# Patient Record
Sex: MALE | Race: WHITE | NOT HISPANIC OR LATINO | Employment: FULL TIME | URBAN - METROPOLITAN AREA
[De-identification: names, ages, dates, MRNs, and addresses within clinical notes are randomized per-mention and may not be internally consistent; named-entity substitution may affect disease eponyms.]

---

## 2021-09-27 ENCOUNTER — HOSPITAL ENCOUNTER (EMERGENCY)
Facility: CLINIC | Age: 31
Discharge: STILL ADMITTED OR ADMINISTRATIVE | End: 2021-09-27

## 2021-09-27 ENCOUNTER — HOSPITAL ENCOUNTER (EMERGENCY)
Facility: CLINIC | Age: 31
Discharge: HOME | End: 2021-09-27
Attending: EMERGENCY MEDICINE | Admitting: EMERGENCY MEDICINE
Payer: COMMERCIAL

## 2021-09-27 VITALS
HEART RATE: 74 BPM | DIASTOLIC BLOOD PRESSURE: 87 MMHG | RESPIRATION RATE: 16 BRPM | OXYGEN SATURATION: 99 % | TEMPERATURE: 98.9 F | SYSTOLIC BLOOD PRESSURE: 140 MMHG | WEIGHT: 315 LBS

## 2021-09-27 DIAGNOSIS — R51.9 HEADACHE DISORDER: ICD-10-CM

## 2021-09-27 DIAGNOSIS — Z20.822 SUSPECTED COVID-19 VIRUS INFECTION: ICD-10-CM

## 2021-09-27 LAB
ALBUMIN SERPL-MCNC: 4.1 G/DL (ref 3.4–5)
ALP SERPL-CCNC: 73 U/L (ref 40–150)
ALT SERPL W P-5'-P-CCNC: 70 U/L (ref 0–70)
ANION GAP SERPL CALCULATED.3IONS-SCNC: 5 MMOL/L (ref 3–14)
AST SERPL W P-5'-P-CCNC: 32 U/L (ref 0–45)
BASOPHILS # BLD AUTO: 0 10E3/UL (ref 0–0.2)
BASOPHILS NFR BLD AUTO: 0 %
BILIRUB SERPL-MCNC: 0.4 MG/DL (ref 0.2–1.3)
BUN SERPL-MCNC: 10 MG/DL (ref 7–30)
CALCIUM SERPL-MCNC: 8.5 MG/DL (ref 8.5–10.1)
CHLORIDE BLD-SCNC: 108 MMOL/L (ref 94–109)
CO2 SERPL-SCNC: 24 MMOL/L (ref 20–32)
CREAT SERPL-MCNC: 1.11 MG/DL (ref 0.66–1.25)
CRP SERPL-MCNC: 27.2 MG/L (ref 0–8)
EOSINOPHIL # BLD AUTO: 0.1 10E3/UL (ref 0–0.7)
EOSINOPHIL NFR BLD AUTO: 1 %
ERYTHROCYTE [DISTWIDTH] IN BLOOD BY AUTOMATED COUNT: 12.3 % (ref 10–15)
ERYTHROCYTE [SEDIMENTATION RATE] IN BLOOD BY WESTERGREN METHOD: 4 MM/HR (ref 0–15)
GFR SERPL CREATININE-BSD FRML MDRD: 89 ML/MIN/1.73M2
GLUCOSE BLD-MCNC: 101 MG/DL (ref 70–99)
HCT VFR BLD AUTO: 41.9 % (ref 40–53)
HGB BLD-MCNC: 15 G/DL (ref 13.3–17.7)
IMM GRANULOCYTES # BLD: 0 10E3/UL
IMM GRANULOCYTES NFR BLD: 0 %
LYMPHOCYTES # BLD AUTO: 0.4 10E3/UL (ref 0.8–5.3)
LYMPHOCYTES NFR BLD AUTO: 7 %
MCH RBC QN AUTO: 33.6 PG (ref 26.5–33)
MCHC RBC AUTO-ENTMCNC: 35.8 G/DL (ref 31.5–36.5)
MCV RBC AUTO: 94 FL (ref 78–100)
MONOCYTES # BLD AUTO: 0.6 10E3/UL (ref 0–1.3)
MONOCYTES NFR BLD AUTO: 10 %
NEUTROPHILS # BLD AUTO: 5 10E3/UL (ref 1.6–8.3)
NEUTROPHILS NFR BLD AUTO: 82 %
NRBC # BLD AUTO: 0 10E3/UL
NRBC BLD AUTO-RTO: 0 /100
PLATELET # BLD AUTO: 154 10E3/UL (ref 150–450)
POTASSIUM BLD-SCNC: 3.7 MMOL/L (ref 3.4–5.3)
PROT SERPL-MCNC: 7.7 G/DL (ref 6.8–8.8)
RBC # BLD AUTO: 4.47 10E6/UL (ref 4.4–5.9)
SARS-COV-2 RNA RESP QL NAA+PROBE: POSITIVE
SODIUM SERPL-SCNC: 137 MMOL/L (ref 133–144)
WBC # BLD AUTO: 6.2 10E3/UL (ref 4–11)

## 2021-09-27 PROCEDURE — 96374 THER/PROPH/DIAG INJ IV PUSH: CPT

## 2021-09-27 PROCEDURE — 99285 EMERGENCY DEPT VISIT HI MDM: CPT | Mod: 25

## 2021-09-27 PROCEDURE — 86140 C-REACTIVE PROTEIN: CPT | Performed by: EMERGENCY MEDICINE

## 2021-09-27 PROCEDURE — 85025 COMPLETE CBC W/AUTO DIFF WBC: CPT | Performed by: EMERGENCY MEDICINE

## 2021-09-27 PROCEDURE — 96375 TX/PRO/DX INJ NEW DRUG ADDON: CPT

## 2021-09-27 PROCEDURE — 250N000011 HC RX IP 250 OP 636: Performed by: EMERGENCY MEDICINE

## 2021-09-27 PROCEDURE — 96361 HYDRATE IV INFUSION ADD-ON: CPT

## 2021-09-27 PROCEDURE — U0005 INFEC AGEN DETEC AMPLI PROBE: HCPCS | Performed by: EMERGENCY MEDICINE

## 2021-09-27 PROCEDURE — 80053 COMPREHEN METABOLIC PANEL: CPT | Performed by: EMERGENCY MEDICINE

## 2021-09-27 PROCEDURE — 85652 RBC SED RATE AUTOMATED: CPT | Performed by: EMERGENCY MEDICINE

## 2021-09-27 PROCEDURE — 99284 EMERGENCY DEPT VISIT MOD MDM: CPT | Performed by: EMERGENCY MEDICINE

## 2021-09-27 PROCEDURE — 36415 COLL VENOUS BLD VENIPUNCTURE: CPT | Performed by: EMERGENCY MEDICINE

## 2021-09-27 PROCEDURE — 258N000003 HC RX IP 258 OP 636: Performed by: EMERGENCY MEDICINE

## 2021-09-27 RX ORDER — ACETAMINOPHEN 500 MG
1000 TABLET ORAL EVERY 6 HOURS PRN
COMMUNITY

## 2021-09-27 RX ORDER — HYDROMORPHONE HYDROCHLORIDE 1 MG/ML
0.5 INJECTION, SOLUTION INTRAMUSCULAR; INTRAVENOUS; SUBCUTANEOUS
Status: DISCONTINUED | OUTPATIENT
Start: 2021-09-27 | End: 2021-09-27 | Stop reason: HOSPADM

## 2021-09-27 RX ORDER — KETOROLAC TROMETHAMINE 15 MG/ML
15 INJECTION, SOLUTION INTRAMUSCULAR; INTRAVENOUS ONCE
Status: COMPLETED | OUTPATIENT
Start: 2021-09-27 | End: 2021-09-27

## 2021-09-27 RX ORDER — IBUPROFEN 200 MG
400 TABLET ORAL EVERY 4 HOURS PRN
COMMUNITY

## 2021-09-27 RX ORDER — PAROXETINE HYDROCHLORIDE HEMIHYDRATE 37.5 MG/1
37.5 TABLET, FILM COATED, EXTENDED RELEASE ORAL DAILY
COMMUNITY
Start: 2021-08-05

## 2021-09-27 RX ORDER — SODIUM CHLORIDE 9 MG/ML
INJECTION, SOLUTION INTRAVENOUS CONTINUOUS
Status: DISCONTINUED | OUTPATIENT
Start: 2021-09-27 | End: 2021-09-27 | Stop reason: HOSPADM

## 2021-09-27 RX ADMIN — HYDROMORPHONE HYDROCHLORIDE 0.5 MG: 1 INJECTION, SOLUTION INTRAMUSCULAR; INTRAVENOUS; SUBCUTANEOUS at 17:37

## 2021-09-27 RX ADMIN — KETOROLAC TROMETHAMINE 15 MG: 15 INJECTION, SOLUTION INTRAMUSCULAR; INTRAVENOUS at 16:27

## 2021-09-27 RX ADMIN — SODIUM CHLORIDE 1000 ML: 9 INJECTION, SOLUTION INTRAVENOUS at 16:27

## 2021-09-27 NOTE — DISCHARGE INSTRUCTIONS
Return to the emergency department if you develop new or worsening symptoms.  Use ibuprofen and Tylenol for the headache.  If your Covid swab is negative and you still have a headache we will need to reevaluate and potentially do further testing.  I hope you get better quickly.  Please quarantine yourself until you are certain you do not have Covid.

## 2021-09-27 NOTE — ED PROVIDER NOTES
History     Chief Complaint   Patient presents with     Fever     Headache     HPI  Gucci Khan is a 31 year old male who presents to the ER secondary to fever, headache, light sensitivity and exposure to covid.  He has been sick for two days. No recent tick bites.  No altered mental status.  Temp up to 102.  No body aches, cough, congestion or runny nose. Oscar gregg developed fever as well and tested positive for covid.      Allergies:  Allergies   Allergen Reactions     Sulfa Drugs Rash     Zithromax [Azithromycin] Rash       Problem List:    There are no problems to display for this patient.       Past Medical History:    History reviewed. No pertinent past medical history.    Past Surgical History:    History reviewed. No pertinent surgical history.    Family History:    History reviewed. No pertinent family history.    Social History:  Marital Status:  Single [1]  Social History     Tobacco Use     Smoking status: Never Smoker     Smokeless tobacco: Current User     Types: Chew   Substance Use Topics     Alcohol use: Not Currently     Comment: occ     Drug use: Not Currently     Types: Marijuana     Comment: from time to time        Medications:    acetaminophen (TYLENOL) 500 MG tablet  ibuprofen (ADVIL/MOTRIN) 200 MG tablet  PARoxetine (PAXIL-CR) 37.5 MG 24 hr tablet          Review of Systems   All other systems reviewed and are negative.      Physical Exam   BP: (!) 154/102  Pulse: 104  Temp: 98.9  F (37.2  C)  Resp: 18  Weight: 146.6 kg (323 lb 1.6 oz)  SpO2: 98 %      Physical Exam  Vitals and nursing note reviewed.   Constitutional:       General: He is not in acute distress.     Appearance: He is well-developed. He is not diaphoretic.   HENT:      Head: Normocephalic and atraumatic.      Nose: Nose normal.      Mouth/Throat:      Mouth: Mucous membranes are moist.   Eyes:      General: No scleral icterus.        Right eye: No discharge.         Left eye: No discharge.      Conjunctiva/sclera:  Conjunctivae normal.      Comments: Mild photophobia   Cardiovascular:      Rate and Rhythm: Normal rate and regular rhythm.   Musculoskeletal:      Cervical back: Normal range of motion and neck supple.   Skin:     General: Skin is warm and dry.      Findings: No rash.   Neurological:      General: No focal deficit present.      Mental Status: He is alert and oriented to person, place, and time.   Psychiatric:         Mood and Affect: Mood normal.         Thought Content: Thought content normal.         ED Course        Procedures             Results for orders placed or performed during the hospital encounter of 09/27/21 (from the past 24 hour(s))   CBC with platelets differential    Narrative    The following orders were created for panel order CBC with platelets differential.  Procedure                               Abnormality         Status                     ---------                               -----------         ------                     CBC with platelets and d...[[id number]]  Abnormal            Final result                 Please view results for these tests on the individual orders.   Comprehensive metabolic panel   Result Value Ref Range    Sodium 137 133 - 144 mmol/L    Potassium 3.7 3.4 - 5.3 mmol/L    Chloride 108 94 - 109 mmol/L    Carbon Dioxide (CO2) 24 20 - 32 mmol/L    Anion Gap 5 3 - 14 mmol/L    Urea Nitrogen 10 7 - 30 mg/dL    Creatinine 1.11 0.66 - 1.25 mg/dL    Calcium 8.5 8.5 - 10.1 mg/dL    Glucose 101 (H) 70 - 99 mg/dL    Alkaline Phosphatase 73 40 - 150 U/L    AST 32 0 - 45 U/L    ALT 70 0 - 70 U/L    Protein Total 7.7 6.8 - 8.8 g/dL    Albumin 4.1 3.4 - 5.0 g/dL    Bilirubin Total 0.4 0.2 - 1.3 mg/dL    GFR Estimate 89 >60 mL/min/1.73m2   CRP inflammation   Result Value Ref Range    CRP Inflammation 27.2 (H) 0.0 - 8.0 mg/L   Erythrocyte sedimentation rate auto   Result Value Ref Range    Erythrocyte Sedimentation Rate 4 0 - 15 mm/hr   CBC with platelets and differential   Result  Value Ref Range    WBC Count 6.2 4.0 - 11.0 10e3/uL    RBC Count 4.47 4.40 - 5.90 10e6/uL    Hemoglobin 15.0 13.3 - 17.7 g/dL    Hematocrit 41.9 40.0 - 53.0 %    MCV 94 78 - 100 fL    MCH 33.6 (H) 26.5 - 33.0 pg    MCHC 35.8 31.5 - 36.5 g/dL    RDW 12.3 10.0 - 15.0 %    Platelet Count 154 150 - 450 10e3/uL    % Neutrophils 82 %    % Lymphocytes 7 %    % Monocytes 10 %    % Eosinophils 1 %    % Basophils 0 %    % Immature Granulocytes 0 %    NRBCs per 100 WBC 0 <1 /100    Absolute Neutrophils 5.0 1.6 - 8.3 10e3/uL    Absolute Lymphocytes 0.4 (L) 0.8 - 5.3 10e3/uL    Absolute Monocytes 0.6 0.0 - 1.3 10e3/uL    Absolute Eosinophils 0.1 0.0 - 0.7 10e3/uL    Absolute Basophils 0.0 0.0 - 0.2 10e3/uL    Absolute Immature Granulocytes 0.0 <=0.0 10e3/uL    Absolute NRBCs 0.0 10e3/uL       Medications   0.9% sodium chloride BOLUS (0 mLs Intravenous Stopped 9/27/21 1737)     Followed by   sodium chloride 0.9% infusion (has no administration in time range)   HYDROmorphone (PF) (DILAUDID) injection 0.5 mg (0.5 mg Intravenous Given 9/27/21 1737)   ketorolac (TORADOL) injection 15 mg (15 mg Intravenous Given 9/27/21 1627)       Assessments & Plan (with Medical Decision Making)  Headache, fever, exposure to covid  Saline lock, ivf, toradol, labs drawn. crp elevated wbc count is normal. Likely covid. covid pcr is pending.  Headache improved with the dilaudid. No photophobia, no confusion.  Likely covid.  Patient warned that if covid negative and headache persists without other symptoms would need further work up such has head ct, LP.  Patient understands and agrees with the plan. Discharged home in stable condition.      I have reviewed the nursing notes.    I have reviewed the findings, diagnosis, plan and need for follow up with the patient.      New Prescriptions    No medications on file       Final diagnoses:   Suspected COVID-19 virus infection   Headache disorder       9/27/2021   [facility] EMERGENCY DEPT      Altaf Stack MD  09/27/21 4207

## 2021-10-24 ENCOUNTER — HEALTH MAINTENANCE LETTER (OUTPATIENT)
Age: 31
End: 2021-10-24

## 2022-10-15 ENCOUNTER — HEALTH MAINTENANCE LETTER (OUTPATIENT)
Age: 32
End: 2022-10-15

## 2022-12-03 ENCOUNTER — HEALTH MAINTENANCE LETTER (OUTPATIENT)
Age: 32
End: 2022-12-03

## 2023-08-13 ENCOUNTER — HOSPITAL ENCOUNTER (EMERGENCY)
Facility: CLINIC | Age: 33
Discharge: HOME | End: 2023-08-13
Attending: FAMILY MEDICINE | Admitting: FAMILY MEDICINE
Payer: COMMERCIAL

## 2023-08-13 VITALS
BODY MASS INDEX: 34.7 KG/M2 | HEIGHT: 77 IN | OXYGEN SATURATION: 97 % | TEMPERATURE: 97.7 F | WEIGHT: 293.9 LBS | HEART RATE: 84 BPM | SYSTOLIC BLOOD PRESSURE: 145 MMHG | RESPIRATION RATE: 20 BRPM | DIASTOLIC BLOOD PRESSURE: 105 MMHG

## 2023-08-13 DIAGNOSIS — M25.50 ARTHRALGIA, UNSPECIFIED JOINT: ICD-10-CM

## 2023-08-13 LAB
ALBUMIN SERPL BCG-MCNC: 3.8 G/DL (ref 3.5–5.2)
ALBUMIN UR-MCNC: NEGATIVE MG/DL
ALP SERPL-CCNC: 99 U/L (ref 40–129)
ALT SERPL W P-5'-P-CCNC: 26 U/L (ref 0–70)
ANION GAP SERPL CALCULATED.3IONS-SCNC: 15 MMOL/L (ref 7–15)
APPEARANCE UR: CLEAR
AST SERPL W P-5'-P-CCNC: 16 U/L (ref 0–45)
BASOPHILS # BLD AUTO: 0.1 10E3/UL (ref 0–0.2)
BASOPHILS NFR BLD AUTO: 1 %
BILIRUB SERPL-MCNC: 0.5 MG/DL
BILIRUB UR QL STRIP: NEGATIVE
BUN SERPL-MCNC: 15.9 MG/DL (ref 6–20)
CALCIUM SERPL-MCNC: 8.7 MG/DL (ref 8.6–10)
CHLORIDE SERPL-SCNC: 105 MMOL/L (ref 98–107)
COLOR UR AUTO: YELLOW
CREAT SERPL-MCNC: 0.79 MG/DL (ref 0.67–1.17)
CRP SERPL-MCNC: 49.33 MG/L
DEPRECATED HCO3 PLAS-SCNC: 18 MMOL/L (ref 22–29)
EOSINOPHIL # BLD AUTO: 0.5 10E3/UL (ref 0–0.7)
EOSINOPHIL NFR BLD AUTO: 4 %
ERYTHROCYTE [DISTWIDTH] IN BLOOD BY AUTOMATED COUNT: 12.2 % (ref 10–15)
ERYTHROCYTE [SEDIMENTATION RATE] IN BLOOD BY WESTERGREN METHOD: 11 MM/HR (ref 0–15)
GFR SERPL CREATININE-BSD FRML MDRD: >90 ML/MIN/1.73M2
GLUCOSE SERPL-MCNC: 113 MG/DL (ref 70–99)
GLUCOSE UR STRIP-MCNC: NEGATIVE MG/DL
HCT VFR BLD AUTO: 40.4 % (ref 40–53)
HGB BLD-MCNC: 13.8 G/DL (ref 13.3–17.7)
HGB UR QL STRIP: NEGATIVE
HYALINE CASTS: 1 /LPF
IMM GRANULOCYTES # BLD: 0.1 10E3/UL
IMM GRANULOCYTES NFR BLD: 1 %
KETONES UR STRIP-MCNC: NEGATIVE MG/DL
LEUKOCYTE ESTERASE UR QL STRIP: NEGATIVE
LYMPHOCYTES # BLD AUTO: 1.9 10E3/UL (ref 0.8–5.3)
LYMPHOCYTES NFR BLD AUTO: 16 %
MCH RBC QN AUTO: 32.3 PG (ref 26.5–33)
MCHC RBC AUTO-ENTMCNC: 34.2 G/DL (ref 31.5–36.5)
MCV RBC AUTO: 95 FL (ref 78–100)
MONOCYTES # BLD AUTO: 0.7 10E3/UL (ref 0–1.3)
MONOCYTES NFR BLD AUTO: 6 %
MUCOUS THREADS #/AREA URNS LPF: PRESENT /LPF
NEUTROPHILS # BLD AUTO: 8.6 10E3/UL (ref 1.6–8.3)
NEUTROPHILS NFR BLD AUTO: 72 %
NITRATE UR QL: NEGATIVE
NRBC # BLD AUTO: 0 10E3/UL
NRBC BLD AUTO-RTO: 0 /100
PH UR STRIP: 5 [PH] (ref 5–7)
PLATELET # BLD AUTO: 212 10E3/UL (ref 150–450)
POTASSIUM SERPL-SCNC: 4.1 MMOL/L (ref 3.4–5.3)
PROT SERPL-MCNC: 6.9 G/DL (ref 6.4–8.3)
RBC # BLD AUTO: 4.27 10E6/UL (ref 4.4–5.9)
RBC URINE: <1 /HPF
SODIUM SERPL-SCNC: 138 MMOL/L (ref 136–145)
SP GR UR STRIP: 1.03 (ref 1–1.03)
SQUAMOUS EPITHELIAL: <1 /HPF
TSH SERPL DL<=0.005 MIU/L-ACNC: 0.57 UIU/ML (ref 0.3–4.2)
URATE SERPL-MCNC: 5.7 MG/DL (ref 3.4–7)
UROBILINOGEN UR STRIP-MCNC: NORMAL MG/DL
WBC # BLD AUTO: 11.9 10E3/UL (ref 4–11)
WBC URINE: 1 /HPF

## 2023-08-13 PROCEDURE — 86140 C-REACTIVE PROTEIN: CPT | Performed by: FAMILY MEDICINE

## 2023-08-13 PROCEDURE — 36415 COLL VENOUS BLD VENIPUNCTURE: CPT | Performed by: FAMILY MEDICINE

## 2023-08-13 PROCEDURE — 84550 ASSAY OF BLOOD/URIC ACID: CPT | Performed by: FAMILY MEDICINE

## 2023-08-13 PROCEDURE — 80053 COMPREHEN METABOLIC PANEL: CPT | Performed by: FAMILY MEDICINE

## 2023-08-13 PROCEDURE — 84443 ASSAY THYROID STIM HORMONE: CPT | Performed by: FAMILY MEDICINE

## 2023-08-13 PROCEDURE — 250N000011 HC RX IP 250 OP 636: Mod: JZ | Performed by: FAMILY MEDICINE

## 2023-08-13 PROCEDURE — 99284 EMERGENCY DEPT VISIT MOD MDM: CPT | Mod: 25 | Performed by: FAMILY MEDICINE

## 2023-08-13 PROCEDURE — 85652 RBC SED RATE AUTOMATED: CPT | Performed by: FAMILY MEDICINE

## 2023-08-13 PROCEDURE — 86038 ANTINUCLEAR ANTIBODIES: CPT | Performed by: FAMILY MEDICINE

## 2023-08-13 PROCEDURE — 81001 URINALYSIS AUTO W/SCOPE: CPT | Performed by: FAMILY MEDICINE

## 2023-08-13 PROCEDURE — 86431 RHEUMATOID FACTOR QUANT: CPT | Performed by: FAMILY MEDICINE

## 2023-08-13 PROCEDURE — 99284 EMERGENCY DEPT VISIT MOD MDM: CPT | Performed by: FAMILY MEDICINE

## 2023-08-13 PROCEDURE — 85025 COMPLETE CBC W/AUTO DIFF WBC: CPT | Performed by: FAMILY MEDICINE

## 2023-08-13 PROCEDURE — 96372 THER/PROPH/DIAG INJ SC/IM: CPT | Performed by: FAMILY MEDICINE

## 2023-08-13 RX ORDER — HYDROCODONE BITARTRATE AND ACETAMINOPHEN 5; 325 MG/1; MG/1
1 TABLET ORAL
Qty: 6 TABLET | Refills: 0 | Status: SHIPPED | OUTPATIENT
Start: 2023-08-13 | End: 2024-07-25

## 2023-08-13 RX ORDER — PREDNISONE 20 MG/1
60 TABLET ORAL DAILY
Qty: 15 TABLET | Refills: 0 | Status: SHIPPED | OUTPATIENT
Start: 2023-08-13 | End: 2024-07-25

## 2023-08-13 RX ORDER — KETOROLAC TROMETHAMINE 30 MG/ML
60 INJECTION, SOLUTION INTRAMUSCULAR; INTRAVENOUS ONCE
Status: COMPLETED | OUTPATIENT
Start: 2023-08-13 | End: 2023-08-13

## 2023-08-13 RX ADMIN — KETOROLAC TROMETHAMINE 60 MG: 30 INJECTION, SOLUTION INTRAMUSCULAR at 08:28

## 2023-08-13 NOTE — DISCHARGE INSTRUCTIONS
1.  I have put in a referral for you to follow-up with the clinic.  Someone should be contacting you tomorrow to set up an appointment.  2.  Please continue to use the ibuprofen and Tylenol during the day, use the hydrocodone only at night to help you sleep.  Start the prednisone I have prescribed you today.

## 2023-08-13 NOTE — ED PROVIDER NOTES
History     Chief Complaint   Patient presents with    Joint Pain     HPI  Gucci Khan is a 33 year old male who presents to the emergency department with complaints of generalized joint aches that have been going on for the past 3 to 4 weeks.  Patient was seen in clinic about 10 days ago and had Lyme's testing which was negative and a CBC which was negative.  Patient was tried on a 5-day course of prednisone.  Patient states she did not get any improvement from that and continues to have symptoms and feels like they are getting little bit worse.  Last night he had a really hard time sleeping.  He is also taken ibuprofen with no effect.  Patient denies any joint swellings.  He states that all of his joints are involved, any joints that move or pivot.  Denies any dysuria or hematuria.  Denies any fevers or chills.  Denies any new medications.  Denies any rashes.  Denies any URI-like symptoms.  There is no family history of rheumatoid arthritis or other rheumatological problems.  Patient does have a history of gout but has never had multijoint involvement.    Allergies:  Allergies   Allergen Reactions    Sulfa Antibiotics Rash    Zithromax [Azithromycin] Rash       Problem List:    There are no problems to display for this patient.       Past Medical History:    No past medical history on file.    Past Surgical History:    No past surgical history on file.    Family History:    No family history on file.    Social History:  Marital Status:  Single [1]  Social History     Tobacco Use    Smoking status: Never    Smokeless tobacco: Current     Types: Chew   Substance Use Topics    Alcohol use: Not Currently     Comment: occ    Drug use: Not Currently     Types: Marijuana     Comment: from time to time        Medications:    HYDROcodone-acetaminophen (NORCO) 5-325 MG tablet  predniSONE (DELTASONE) 20 MG tablet  acetaminophen (TYLENOL) 500 MG tablet  ibuprofen (ADVIL/MOTRIN) 200 MG tablet  PARoxetine (PAXIL-CR) 37.5 MG  "24 hr tablet          Review of Systems   All other systems reviewed and are negative.      Physical Exam   BP: (!) 152/100  Pulse: 92  Temp: 97.7  F (36.5  C)  Resp: 20  Height: 195.6 cm (6' 5\")  Weight: 133.3 kg (293 lb 14.4 oz)  SpO2: 98 %      Physical Exam  Vitals and nursing note reviewed.   Constitutional:       General: He is not in acute distress.     Appearance: He is well-developed. He is not diaphoretic.   HENT:      Head: Normocephalic and atraumatic.      Nose: Nose normal.      Mouth/Throat:      Pharynx: No oropharyngeal exudate.   Eyes:      General: No scleral icterus.     Conjunctiva/sclera: Conjunctivae normal.      Pupils: Pupils are equal, round, and reactive to light.   Cardiovascular:      Rate and Rhythm: Normal rate and regular rhythm.      Heart sounds: Normal heart sounds. No murmur heard.     No friction rub.   Pulmonary:      Effort: Pulmonary effort is normal. No respiratory distress.      Breath sounds: Normal breath sounds. No wheezing or rales.   Abdominal:      General: Bowel sounds are normal. There is no distension.      Palpations: Abdomen is soft. There is no mass.      Tenderness: There is no abdominal tenderness. There is no guarding or rebound.   Musculoskeletal:         General: No swelling, tenderness or signs of injury. Normal range of motion.      Cervical back: Normal range of motion.      Right lower leg: No edema.      Left lower leg: No edema.   Skin:     General: Skin is warm.      Findings: No rash.   Neurological:      Mental Status: He is alert and oriented to person, place, and time.   Psychiatric:         Judgment: Judgment normal.         ED Course                 Procedures        Results for orders placed or performed during the hospital encounter of 08/13/23 (from the past 24 hour(s))   CBC with platelets differential    Narrative    The following orders were created for panel order CBC with platelets differential.  Procedure                               " Abnormality         Status                     ---------                               -----------         ------                     CBC with platelets and d...[[id number]]  Abnormal            Final result                 Please view results for these tests on the individual orders.   Comprehensive metabolic panel   Result Value Ref Range    Sodium 138 136 - 145 mmol/L    Potassium 4.1 3.4 - 5.3 mmol/L    Chloride 105 98 - 107 mmol/L    Carbon Dioxide (CO2) 18 (L) 22 - 29 mmol/L    Anion Gap 15 7 - 15 mmol/L    Urea Nitrogen 15.9 6.0 - 20.0 mg/dL    Creatinine 0.79 0.67 - 1.17 mg/dL    Calcium 8.7 8.6 - 10.0 mg/dL    Glucose 113 (H) 70 - 99 mg/dL    Alkaline Phosphatase 99 40 - 129 U/L    AST 16 0 - 45 U/L    ALT 26 0 - 70 U/L    Protein Total 6.9 6.4 - 8.3 g/dL    Albumin 3.8 3.5 - 5.2 g/dL    Bilirubin Total 0.5 <=1.2 mg/dL    GFR Estimate >90 >60 mL/min/1.73m2   CRP inflammation   Result Value Ref Range    CRP Inflammation 49.33 (H) <5.00 mg/L   Erythrocyte sedimentation rate auto   Result Value Ref Range    Erythrocyte Sedimentation Rate 11 0 - 15 mm/hr   Uric acid   Result Value Ref Range    Uric Acid 5.7 3.4 - 7.0 mg/dL   TSH with free T4 reflex   Result Value Ref Range    TSH 0.57 0.30 - 4.20 uIU/mL   CBC with platelets and differential   Result Value Ref Range    WBC Count 11.9 (H) 4.0 - 11.0 10e3/uL    RBC Count 4.27 (L) 4.40 - 5.90 10e6/uL    Hemoglobin 13.8 13.3 - 17.7 g/dL    Hematocrit 40.4 40.0 - 53.0 %    MCV 95 78 - 100 fL    MCH 32.3 26.5 - 33.0 pg    MCHC 34.2 31.5 - 36.5 g/dL    RDW 12.2 10.0 - 15.0 %    Platelet Count 212 150 - 450 10e3/uL    % Neutrophils 72 %    % Lymphocytes 16 %    % Monocytes 6 %    % Eosinophils 4 %    % Basophils 1 %    % Immature Granulocytes 1 %    NRBCs per 100 WBC 0 <1 /100    Absolute Neutrophils 8.6 (H) 1.6 - 8.3 10e3/uL    Absolute Lymphocytes 1.9 0.8 - 5.3 10e3/uL    Absolute Monocytes 0.7 0.0 - 1.3 10e3/uL    Absolute Eosinophils 0.5 0.0 - 0.7 10e3/uL    Absolute  Basophils 0.1 0.0 - 0.2 10e3/uL    Absolute Immature Granulocytes 0.1 <=0.4 10e3/uL    Absolute NRBCs 0.0 10e3/uL   UA with Microscopic reflex to Culture    Specimen: Urine, Clean Catch   Result Value Ref Range    Color Urine Yellow Colorless, Straw, Light Yellow, Yellow    Appearance Urine Clear Clear    Glucose Urine Negative Negative mg/dL    Bilirubin Urine Negative Negative    Ketones Urine Negative Negative mg/dL    Specific Gravity Urine 1.030 1.003 - 1.035    Blood Urine Negative Negative    pH Urine 5.0 5.0 - 7.0    Protein Albumin Urine Negative Negative mg/dL    Urobilinogen Urine Normal Normal, 2.0 mg/dL    Nitrite Urine Negative Negative    Leukocyte Esterase Urine Negative Negative    Mucus Urine Present (A) None Seen /LPF    RBC Urine <1 <=2 /HPF    WBC Urine 1 <=5 /HPF    Squamous Epithelials Urine <1 <=1 /HPF    Hyaline Casts Urine 1 <=2 /LPF    Narrative    Urine Culture not indicated       Medications   ketorolac (TORADOL) injection 60 mg (60 mg Intramuscular $Given 8/13/23 0839)     This is a 33-year-old male who comes in for joint aches that have been going on for a number of weeks now.  Patient's already had some labs done which were unremarkable.  I added on some additional labs here including rheumatoid panel and lupus panels which are pending.  I am not sure what is causing his symptoms.  Today his CRP was somewhat elevated so clearly there is some type of inflammatory response that is going on.  Patient was put on steroids before but only at 40 mg, I am going to try him on some steroids again and this time at 60 mg to see if the higher dose may work better.  I need patient to follow-up in clinic to go over the outstanding labs and to discuss further testing for his symptoms.  I do not think there is any thing from the emergency department further that we can do.  Patient may benefit from a referral to rheumatology.  Patient will be discharged at this time.    Assessments & Plan (with  Medical Decision Making)  Arthralgias     I have reviewed the nursing notes.    I have reviewed the findings, diagnosis, plan and need for follow up with the patient.      Discharge Medication List as of 8/13/2023  8:57 AM        START taking these medications    Details   HYDROcodone-acetaminophen (NORCO) 5-325 MG tablet Take 1 tablet by mouth nightly as needed for severe pain, Disp-6 tablet, R-0, E-Prescribe      predniSONE (DELTASONE) 20 MG tablet Take 3 tablets (60 mg) by mouth daily, Disp-15 tablet, R-0, E-Prescribe             Final diagnoses:   Arthralgia, unspecified joint       8/13/2023   [facility] EMERGENCY DEPT       Memo Givens MD  08/13/23 5970

## 2023-08-13 NOTE — ED TRIAGE NOTES
Pt c/o muscle and joint pain that started 4 weeks ago. Was tested for Lyme's a couple weeks ago.   Denies, cough, fevers, CP, or SOA.      Triage Assessment       Row Name 08/13/23 9851       Triage Assessment (Adult)    Airway WDL WDL       Respiratory WDL    Respiratory WDL WDL       Cardiac WDL    Cardiac WDL WDL

## 2023-08-14 LAB
ANA SER QL IF: NEGATIVE
RHEUMATOID FACT SER NEPH-ACNC: <6 IU/ML

## 2023-10-10 ENCOUNTER — TRANSCRIBE ORDERS (OUTPATIENT)
Dept: OTHER | Age: 33
End: 2023-10-10

## 2023-10-10 DIAGNOSIS — R23.0 CYANOSIS OF TIP OF FINGER: ICD-10-CM

## 2023-10-10 DIAGNOSIS — M79.644 PAIN IN FINGER OF BOTH HANDS: Primary | ICD-10-CM

## 2023-10-10 DIAGNOSIS — M25.50 ARTHRALGIA, UNSPECIFIED JOINT: ICD-10-CM

## 2023-10-10 DIAGNOSIS — M79.645 PAIN IN FINGER OF BOTH HANDS: Primary | ICD-10-CM

## 2024-01-07 ENCOUNTER — HEALTH MAINTENANCE LETTER (OUTPATIENT)
Age: 34
End: 2024-01-07

## 2024-07-24 ENCOUNTER — HOSPITAL ENCOUNTER (EMERGENCY)
Facility: CLINIC | Age: 34
Discharge: HOME | End: 2024-07-24
Attending: FAMILY MEDICINE | Admitting: FAMILY MEDICINE
Payer: COMMERCIAL

## 2024-07-24 ENCOUNTER — APPOINTMENT (OUTPATIENT)
Dept: CT IMAGING | Facility: CLINIC | Age: 34
End: 2024-07-24
Attending: FAMILY MEDICINE
Payer: COMMERCIAL

## 2024-07-24 VITALS
TEMPERATURE: 98.6 F | WEIGHT: 289.9 LBS | HEART RATE: 88 BPM | RESPIRATION RATE: 21 BRPM | OXYGEN SATURATION: 99 % | DIASTOLIC BLOOD PRESSURE: 98 MMHG | BODY MASS INDEX: 34.38 KG/M2 | SYSTOLIC BLOOD PRESSURE: 158 MMHG

## 2024-07-24 DIAGNOSIS — R10.30 LOWER ABDOMINAL PAIN: ICD-10-CM

## 2024-07-24 DIAGNOSIS — R19.7 DIARRHEA, UNSPECIFIED TYPE: ICD-10-CM

## 2024-07-24 DIAGNOSIS — K52.9 ACUTE COLITIS: ICD-10-CM

## 2024-07-24 LAB
ADV 40+41 DNA STL QL NAA+NON-PROBE: NEGATIVE
ALBUMIN SERPL BCG-MCNC: 4.4 G/DL (ref 3.5–5.2)
ALBUMIN UR-MCNC: 30 MG/DL
ALP SERPL-CCNC: 110 U/L (ref 40–150)
ALT SERPL W P-5'-P-CCNC: 24 U/L (ref 0–70)
ANION GAP SERPL CALCULATED.3IONS-SCNC: 12 MMOL/L (ref 7–15)
APPEARANCE UR: CLEAR
AST SERPL W P-5'-P-CCNC: 15 U/L (ref 0–45)
ASTRO TYP 1-8 RNA STL QL NAA+NON-PROBE: NEGATIVE
BASOPHILS # BLD AUTO: 0 10E3/UL (ref 0–0.2)
BASOPHILS NFR BLD AUTO: 0 %
BILIRUB SERPL-MCNC: 0.4 MG/DL
BILIRUB UR QL STRIP: NEGATIVE
BUN SERPL-MCNC: 8.1 MG/DL (ref 6–20)
C CAYETANENSIS DNA STL QL NAA+NON-PROBE: NEGATIVE
CALCIUM SERPL-MCNC: 9.6 MG/DL (ref 8.8–10.4)
CAMPYLOBACTER DNA SPEC NAA+PROBE: NEGATIVE
CHLORIDE SERPL-SCNC: 100 MMOL/L (ref 98–107)
COLOR UR AUTO: YELLOW
CREAT SERPL-MCNC: 0.91 MG/DL (ref 0.67–1.17)
CRP SERPL-MCNC: 50.37 MG/L
CRYPTOSP DNA STL QL NAA+NON-PROBE: NEGATIVE
E COLI O157 DNA STL QL NAA+NON-PROBE: NORMAL
E HISTOLYT DNA STL QL NAA+NON-PROBE: NEGATIVE
EAEC ASTA GENE ISLT QL NAA+PROBE: NEGATIVE
EC STX1+STX2 GENES STL QL NAA+NON-PROBE: NEGATIVE
EGFRCR SERPLBLD CKD-EPI 2021: >90 ML/MIN/1.73M2
EOSINOPHIL # BLD AUTO: 0.1 10E3/UL (ref 0–0.7)
EOSINOPHIL NFR BLD AUTO: 1 %
EPEC EAE GENE STL QL NAA+NON-PROBE: NEGATIVE
ERYTHROCYTE [DISTWIDTH] IN BLOOD BY AUTOMATED COUNT: 12.2 % (ref 10–15)
ETEC LTA+ST1A+ST1B TOX ST NAA+NON-PROBE: NEGATIVE
G LAMBLIA DNA STL QL NAA+NON-PROBE: NEGATIVE
GLUCOSE SERPL-MCNC: 106 MG/DL (ref 70–99)
GLUCOSE UR STRIP-MCNC: NEGATIVE MG/DL
HCO3 SERPL-SCNC: 23 MMOL/L (ref 22–29)
HCT VFR BLD AUTO: 45.9 % (ref 40–53)
HGB BLD-MCNC: 16.3 G/DL (ref 13.3–17.7)
HGB UR QL STRIP: NEGATIVE
IMM GRANULOCYTES # BLD: 0.1 10E3/UL
IMM GRANULOCYTES NFR BLD: 0 %
KETONES UR STRIP-MCNC: 5 MG/DL
LEUKOCYTE ESTERASE UR QL STRIP: NEGATIVE
LYMPHOCYTES # BLD AUTO: 2 10E3/UL (ref 0.8–5.3)
LYMPHOCYTES NFR BLD AUTO: 15 %
MCH RBC QN AUTO: 32.9 PG (ref 26.5–33)
MCHC RBC AUTO-ENTMCNC: 35.5 G/DL (ref 31.5–36.5)
MCV RBC AUTO: 93 FL (ref 78–100)
MONOCYTES # BLD AUTO: 0.9 10E3/UL (ref 0–1.3)
MONOCYTES NFR BLD AUTO: 6 %
MUCOUS THREADS #/AREA URNS LPF: PRESENT /LPF
NEUTROPHILS # BLD AUTO: 10.4 10E3/UL (ref 1.6–8.3)
NEUTROPHILS NFR BLD AUTO: 78 %
NITRATE UR QL: NEGATIVE
NOROVIRUS GI+II RNA STL QL NAA+NON-PROBE: NEGATIVE
NRBC # BLD AUTO: 0 10E3/UL
NRBC BLD AUTO-RTO: 0 /100
P SHIGELLOIDES DNA STL QL NAA+NON-PROBE: NEGATIVE
PH UR STRIP: 5 [PH] (ref 5–7)
PLATELET # BLD AUTO: 232 10E3/UL (ref 150–450)
POTASSIUM SERPL-SCNC: 3.9 MMOL/L (ref 3.4–5.3)
PROT SERPL-MCNC: 8 G/DL (ref 6.4–8.3)
RBC # BLD AUTO: 4.96 10E6/UL (ref 4.4–5.9)
RBC URINE: 1 /HPF
RVA RNA STL QL NAA+NON-PROBE: NEGATIVE
SALMONELLA SP RPOD STL QL NAA+PROBE: NEGATIVE
SAPO I+II+IV+V RNA STL QL NAA+NON-PROBE: NEGATIVE
SHIGELLA SP+EIEC IPAH ST NAA+NON-PROBE: NEGATIVE
SODIUM SERPL-SCNC: 135 MMOL/L (ref 135–145)
SP GR UR STRIP: 1.02 (ref 1–1.03)
UROBILINOGEN UR STRIP-MCNC: NORMAL MG/DL
V CHOLERAE DNA SPEC QL NAA+PROBE: NEGATIVE
VIBRIO DNA SPEC NAA+PROBE: NEGATIVE
WBC # BLD AUTO: 13.4 10E3/UL (ref 4–11)
WBC URINE: <1 /HPF
Y ENTEROCOL DNA STL QL NAA+PROBE: NEGATIVE

## 2024-07-24 PROCEDURE — 81001 URINALYSIS AUTO W/SCOPE: CPT | Performed by: FAMILY MEDICINE

## 2024-07-24 PROCEDURE — 74176 CT ABD & PELVIS W/O CONTRAST: CPT

## 2024-07-24 PROCEDURE — 86140 C-REACTIVE PROTEIN: CPT | Performed by: FAMILY MEDICINE

## 2024-07-24 PROCEDURE — 96374 THER/PROPH/DIAG INJ IV PUSH: CPT | Mod: 59 | Performed by: FAMILY MEDICINE

## 2024-07-24 PROCEDURE — 250N000011 HC RX IP 250 OP 636: Performed by: FAMILY MEDICINE

## 2024-07-24 PROCEDURE — 96361 HYDRATE IV INFUSION ADD-ON: CPT | Performed by: FAMILY MEDICINE

## 2024-07-24 PROCEDURE — 99284 EMERGENCY DEPT VISIT MOD MDM: CPT | Performed by: FAMILY MEDICINE

## 2024-07-24 PROCEDURE — 85025 COMPLETE CBC W/AUTO DIFF WBC: CPT | Performed by: FAMILY MEDICINE

## 2024-07-24 PROCEDURE — 82040 ASSAY OF SERUM ALBUMIN: CPT | Performed by: FAMILY MEDICINE

## 2024-07-24 PROCEDURE — 36415 COLL VENOUS BLD VENIPUNCTURE: CPT | Performed by: FAMILY MEDICINE

## 2024-07-24 PROCEDURE — 258N000003 HC RX IP 258 OP 636: Performed by: FAMILY MEDICINE

## 2024-07-24 PROCEDURE — 96375 TX/PRO/DX INJ NEW DRUG ADDON: CPT | Performed by: FAMILY MEDICINE

## 2024-07-24 PROCEDURE — 87507 IADNA-DNA/RNA PROBE TQ 12-25: CPT | Performed by: FAMILY MEDICINE

## 2024-07-24 PROCEDURE — 99285 EMERGENCY DEPT VISIT HI MDM: CPT | Mod: 25 | Performed by: FAMILY MEDICINE

## 2024-07-24 RX ORDER — KETOROLAC TROMETHAMINE 30 MG/ML
30 INJECTION, SOLUTION INTRAMUSCULAR; INTRAVENOUS ONCE
Status: COMPLETED | OUTPATIENT
Start: 2024-07-24 | End: 2024-07-24

## 2024-07-24 RX ORDER — ONDANSETRON 2 MG/ML
4 INJECTION INTRAMUSCULAR; INTRAVENOUS ONCE
Status: COMPLETED | OUTPATIENT
Start: 2024-07-24 | End: 2024-07-24

## 2024-07-24 RX ADMIN — KETOROLAC TROMETHAMINE 30 MG: 30 INJECTION, SOLUTION INTRAMUSCULAR at 15:58

## 2024-07-24 RX ADMIN — SODIUM CHLORIDE 1000 ML: 9 INJECTION, SOLUTION INTRAVENOUS at 15:20

## 2024-07-24 RX ADMIN — ONDANSETRON 4 MG: 2 INJECTION INTRAMUSCULAR; INTRAVENOUS at 16:00

## 2024-07-24 ASSESSMENT — COLUMBIA-SUICIDE SEVERITY RATING SCALE - C-SSRS
1. IN THE PAST MONTH, HAVE YOU WISHED YOU WERE DEAD OR WISHED YOU COULD GO TO SLEEP AND NOT WAKE UP?: NO
2. HAVE YOU ACTUALLY HAD ANY THOUGHTS OF KILLING YOURSELF IN THE PAST MONTH?: NO
6. HAVE YOU EVER DONE ANYTHING, STARTED TO DO ANYTHING, OR PREPARED TO DO ANYTHING TO END YOUR LIFE?: NO

## 2024-07-24 ASSESSMENT — ACTIVITIES OF DAILY LIVING (ADL)
ADLS_ACUITY_SCORE: 33
ADLS_ACUITY_SCORE: 35
ADLS_ACUITY_SCORE: 35

## 2024-07-24 NOTE — ED TRIAGE NOTES
Patient presents with lower abdominal pain, n/v/d since last night. Patient has had a cold since last Monday and started antibiotics since yesterday. Seen at  yesterday.

## 2024-07-24 NOTE — ED PROVIDER NOTES
[facility] ED Provider Note   Patient: Gucci Khan  MRN #:  [id number]  Date of Visit: July 24, 2024    CC:     Chief Complaint   Patient presents with    Abdominal Pain     HPI:  Gucci Khan is a 34 year old male who presented to the emergency department with acute onsets of lower abdominal pain associated with numerous small episodes of watery stools, and occasional dry heaving.  Patient reports onset of URI symptoms beginning last Monday.  He was seen at urgent care in [place] yesterday and started on amoxicillin and has taken 2 doses.  He started develop abdominal pain several hours after he took his first dose of antibiotics.  He states that the cold symptoms are improving but now he is dealing with the abdominal pain.  He is had waves of abdominal pain/cramping leading to some watery stools.  No fever or chills, sore throat, cough, urinary symptoms.  There has been exposure to illnesses at work.  Patient has had previous surgery for appendectomy at age 14.  No personal history of Crohn's or ulcerative colitis.  Personal history of irritable bowel syndrome.    Problem List:  There are no problems to display for this patient.      No past medical history on file.    MEDS: acetaminophen (TYLENOL) 500 MG tablet  HYDROcodone-acetaminophen (NORCO) 5-325 MG tablet  ibuprofen (ADVIL/MOTRIN) 200 MG tablet  PARoxetine (PAXIL-CR) 37.5 MG 24 hr tablet  predniSONE (DELTASONE) 20 MG tablet        ALLERGIES:    Allergies   Allergen Reactions    Sulfa Antibiotics Rash    Zithromax [Azithromycin] Rash       No past surgical history on file.    Social History     Tobacco Use    Smoking status: Never    Smokeless tobacco: Current     Types: Chew   Substance Use Topics    Alcohol use: Not Currently     Comment: occ    Drug use: Not Currently     Types: Marijuana     Comment: from time to time         Review of Systems   Except as noted in HPI, all other systems  were reviewed and are negative    Physical Exam   Vitals were reviewed  Patient Vitals for the past 12 hrs:   BP Temp Temp src Pulse Resp SpO2 Weight   07/24/24 1722 (!) 158/98 -- -- 88 21 -- --   07/24/24 1416 (!) 163/115 98.6  F (37  C) Temporal 95 20 99 % 131.5 kg (289 lb 14.4 oz)     GENERAL APPEARANCE: Alert and oriented x 3, moderate distress due to pain  FACE: normal facies  EYES: Pupils are equal; sclera is nonicteric  HENT: normal external exam  NECK: no adenopathy or asymmetry  RESP: normal respiratory effort; clear breath sounds bilaterally  CV: regular rate and rhythm; no significant murmurs, gallops or rubs  ABD: soft, obese, lower abdominal tenderness; no rebound or guarding; bowel sounds are present; no CVA tenderness  EXT: No calf tenderness or pitting edema  SKIN: no worrisome rash  NEURO: no facial droop; no focal deficits, speech is normal        Available Lab/Imaging Results     Results for orders placed or performed during the hospital encounter of 07/24/24 (from the past 24 hour(s))   CBC with platelets differential    Narrative    The following orders were created for panel order CBC with platelets differential.  Procedure                               Abnormality         Status                     ---------                               -----------         ------                     CBC with platelets and d...[[id number]]  Abnormal            Final result                 Please view results for these tests on the individual orders.   Comprehensive metabolic panel   Result Value Ref Range    Sodium 135 135 - 145 mmol/L    Potassium 3.9 3.4 - 5.3 mmol/L    Carbon Dioxide (CO2) 23 22 - 29 mmol/L    Anion Gap 12 7 - 15 mmol/L    Urea Nitrogen 8.1 6.0 - 20.0 mg/dL    Creatinine 0.91 0.67 - 1.17 mg/dL    GFR Estimate >90 >60 mL/min/1.73m2    Calcium 9.6 8.8 - 10.4 mg/dL    Chloride 100 98 - 107 mmol/L    Glucose 106 (H) 70 - 99 mg/dL    Alkaline Phosphatase 110 40 - 150 U/L    AST 15 0 - 45 U/L     ALT 24 0 - 70 U/L    Protein Total 8.0 6.4 - 8.3 g/dL    Albumin 4.4 3.5 - 5.2 g/dL    Bilirubin Total 0.4 <=1.2 mg/dL   CRP inflammation   Result Value Ref Range    CRP Inflammation 50.37 (H) <5.00 mg/L   CBC with platelets and differential   Result Value Ref Range    WBC Count 13.4 (H) 4.0 - 11.0 10e3/uL    RBC Count 4.96 4.40 - 5.90 10e6/uL    Hemoglobin 16.3 13.3 - 17.7 g/dL    Hematocrit 45.9 40.0 - 53.0 %    MCV 93 78 - 100 fL    MCH 32.9 26.5 - 33.0 pg    MCHC 35.5 31.5 - 36.5 g/dL    RDW 12.2 10.0 - 15.0 %    Platelet Count 232 150 - 450 10e3/uL    % Neutrophils 78 %    % Lymphocytes 15 %    % Monocytes 6 %    % Eosinophils 1 %    % Basophils 0 %    % Immature Granulocytes 0 %    NRBCs per 100 WBC 0 <1 /100    Absolute Neutrophils 10.4 (H) 1.6 - 8.3 10e3/uL    Absolute Lymphocytes 2.0 0.8 - 5.3 10e3/uL    Absolute Monocytes 0.9 0.0 - 1.3 10e3/uL    Absolute Eosinophils 0.1 0.0 - 0.7 10e3/uL    Absolute Basophils 0.0 0.0 - 0.2 10e3/uL    Absolute Immature Granulocytes 0.1 <=0.4 10e3/uL    Absolute NRBCs 0.0 10e3/uL   Abd/pelvis CT - no contrast - Stone Protocol    Narrative    CT ABDOMEN PELVIS W/O CONTRAST 7/24/2024 3:22 PM    CLINICAL HISTORY: Lower abdominal pain and cramping x 24 hours  TECHNIQUE: CT scan of the abdomen and pelvis was performed without IV  contrast. Multiplanar reformats were obtained. Dose reduction  techniques were used.  CONTRAST: None.    COMPARISON: CT of the abdomen and pelvis dated 4/14/2004.    FINDINGS:   LOWER CHEST: Normal.    HEPATOBILIARY: Normal.    PANCREAS: Normal.     SPLEEN: Normal.    ADRENAL GLANDS: Normal.    KIDNEYS/BLADDER: Normal.    BOWEL: The colon is decompressed. Accounting for this, there is  suggestion of mild colonic wall thickening, particularly of the  transverse colon. Some associated areas of minimal mesenteric edema.  No pneumatosis or portal venous gas. No pneumoperitoneum. The stomach  and small bowel are unremarkable. The appendix is  reportedly  surgically absent.    LYMPH NODES: Normal.    VASCULATURE: Normal caliber abdominal aorta.    PELVIC ORGANS: Normal.    MUSCULOSKELETAL: Normal.      Impression    IMPRESSION:   1.  Evaluation of the colon is limited by under distention and lack of  intravenous contrast. However, there is suggestion of mild mural  thickening and mesenteric edema, concerning for colitis.    BUCK AVILEZ MD         SYSTEM ID:  [id number]   UA with Microscopic reflex to Culture    Specimen: Urine, Midstream   Result Value Ref Range    Color Urine Yellow Colorless, Straw, Light Yellow, Yellow    Appearance Urine Clear Clear    Glucose Urine Negative Negative mg/dL    Bilirubin Urine Negative Negative    Ketones Urine 5 (A) Negative mg/dL    Specific Gravity Urine 1.021 1.003 - 1.035    Blood Urine Negative Negative    pH Urine 5.0 5.0 - 7.0    Protein Albumin Urine 30 (A) Negative mg/dL    Urobilinogen Urine Normal Normal, 2.0 mg/dL    Nitrite Urine Negative Negative    Leukocyte Esterase Urine Negative Negative    Mucus Urine Present (A) None Seen /LPF    RBC Urine 1 <=2 /HPF    WBC Urine <1 <=5 /HPF    Narrative    Urine Culture not indicated                Impression     Final diagnoses:   Lower abdominal pain   Diarrhea, unspecified type   Acute colitis         ED Course & Medical Decision Making   Gucci Khan is a 34 year old male who presented to the emergency department with onset of abdominal pain that started yesterday located in the lower suprapubic region.  There is no radiation of pain from the flank.  Patient has had recent cold since a week ago, was seen at urgent care yesterday and [place] and prescribed amoxicillin.  He developed abdominal pain after taking his first dose of antibiotic.  Since then he continues to have waves of cramping pain associated with small amount of watery stools.  There has not been any blood or mucus.  No recent foreign travels.  Recent antibiotic started yesterday.  No personal  history of Crohn's disease, ulcerative colitis or C. difficile colitis.  Prior appendectomy.    Vital signs reveal a temp of 98.6, blood pressure 163/115, heart rate of 95, respiration 20, with 99% oxygen saturation.  Exam was unremarkable except for lower abdominal tenderness.  No rebound or guarding.  No CVA tenderness.  Bowel sounds are present.    Patient received the following medications while in the ED:  Medications   sodium chloride 0.9% BOLUS 1,000 mL (0 mLs Intravenous Stopped 7/24/24 1657)   ketorolac (TORADOL) injection 30 mg (30 mg Intravenous $Given 7/24/24 1558)   ondansetron (ZOFRAN) injection 4 mg (4 mg Intravenous $Given 7/24/24 1600)      Patient was able to collect stool specimen and we will send for enteric pathogen.  Aftercare instructions were discussed including staying with clear liquid diet for the next 24-48 hours.  Advance the diet slowly, and resort to the previous tolerable diet if he has any breakthrough symptoms.  He can take some ibuprofen or naproxen for his pain.  Patient may ultimately need to have a colonoscopy when his symptoms improved.  Patient and his mother expressed understanding, comfort and agreement with discharge instructions below.  We will contact the patient if his stool studies are positive.        Written after-visit summary and instructions were given at the time of discharge.    Follow up Plan:   [facility]  [address] [place] [address] [phone]    In 3 days  if not improving    [facility]  [address] [place] [address] [phone]    If symptoms worsen      Discharge Instructions:   Your white blood count and inflammation markers are elevated, along with a CT scan that is suspicious for acute colitis.  We will await results of your stool testing to determine whether you need to have further treatment.  Hold your amoxicillin for now.  Please stay with sips of clear liquids for the  next 24-48 hours.  Take ibuprofen/naproxen as needed for pain.  Follow-up with your primary care provider within the next 3 days if not improving.  Return to the emergency department if you develop new or worsening symptoms.       Disclaimer: This note consists of words and symbols derived from keyboarding and dictation using voice recognition software.  As a result, there may be errors that have gone undetected.  Please consider this when interpreting information found in this note.       Jonn Stratton MD  07/24/24 2578

## 2024-07-24 NOTE — DISCHARGE INSTRUCTIONS
Your white blood count and inflammation markers are elevated, along with a CT scan that is suspicious for acute colitis.  We will await results of your stool testing to determine whether you need to have further treatment.  Hold your amoxicillin for now.  Please stay with sips of clear liquids for the next 24-48 hours.  Take ibuprofen/naproxen as needed for pain.  Follow-up with your primary care provider within the next 3 days if not improving.  Return to the emergency department if you develop new or worsening symptoms.

## 2024-07-25 ENCOUNTER — HOSPITAL ENCOUNTER (EMERGENCY)
Facility: CLINIC | Age: 34
Discharge: HOME | End: 2024-07-25
Attending: FAMILY MEDICINE | Admitting: FAMILY MEDICINE
Payer: COMMERCIAL

## 2024-07-25 VITALS
HEIGHT: 77 IN | BODY MASS INDEX: 34.38 KG/M2 | RESPIRATION RATE: 18 BRPM | SYSTOLIC BLOOD PRESSURE: 135 MMHG | OXYGEN SATURATION: 93 % | DIASTOLIC BLOOD PRESSURE: 93 MMHG | TEMPERATURE: 98.7 F | HEART RATE: 84 BPM

## 2024-07-25 DIAGNOSIS — K52.9 COLITIS: ICD-10-CM

## 2024-07-25 DIAGNOSIS — R10.84 GENERALIZED ABDOMINAL PAIN: ICD-10-CM

## 2024-07-25 DIAGNOSIS — R19.7 NAUSEA VOMITING AND DIARRHEA: ICD-10-CM

## 2024-07-25 DIAGNOSIS — R11.2 NAUSEA VOMITING AND DIARRHEA: ICD-10-CM

## 2024-07-25 LAB
ALBUMIN SERPL BCG-MCNC: 4.4 G/DL (ref 3.5–5.2)
ALP SERPL-CCNC: 108 U/L (ref 40–150)
ALT SERPL W P-5'-P-CCNC: 24 U/L (ref 0–70)
ANION GAP SERPL CALCULATED.3IONS-SCNC: 16 MMOL/L (ref 7–15)
AST SERPL W P-5'-P-CCNC: 20 U/L (ref 0–45)
BASOPHILS # BLD AUTO: 0.1 10E3/UL (ref 0–0.2)
BASOPHILS NFR BLD AUTO: 1 %
BILIRUB SERPL-MCNC: 0.5 MG/DL
BUN SERPL-MCNC: 9.7 MG/DL (ref 6–20)
CALCIUM SERPL-MCNC: 9.5 MG/DL (ref 8.8–10.4)
CHLORIDE SERPL-SCNC: 105 MMOL/L (ref 98–107)
CREAT SERPL-MCNC: 0.99 MG/DL (ref 0.67–1.17)
EGFRCR SERPLBLD CKD-EPI 2021: >90 ML/MIN/1.73M2
EOSINOPHIL # BLD AUTO: 0.2 10E3/UL (ref 0–0.7)
EOSINOPHIL NFR BLD AUTO: 2 %
ERYTHROCYTE [DISTWIDTH] IN BLOOD BY AUTOMATED COUNT: 12.3 % (ref 10–15)
FLUAV RNA SPEC QL NAA+PROBE: NEGATIVE
FLUBV RNA RESP QL NAA+PROBE: NEGATIVE
GLUCOSE SERPL-MCNC: 148 MG/DL (ref 70–99)
HCO3 SERPL-SCNC: 17 MMOL/L (ref 22–29)
HCT VFR BLD AUTO: 44.9 % (ref 40–53)
HGB BLD-MCNC: 16.4 G/DL (ref 13.3–17.7)
IMM GRANULOCYTES # BLD: 0 10E3/UL
IMM GRANULOCYTES NFR BLD: 0 %
LIPASE SERPL-CCNC: 14 U/L (ref 13–60)
LYMPHOCYTES # BLD AUTO: 2.7 10E3/UL (ref 0.8–5.3)
LYMPHOCYTES NFR BLD AUTO: 25 %
MCH RBC QN AUTO: 33.1 PG (ref 26.5–33)
MCHC RBC AUTO-ENTMCNC: 36.5 G/DL (ref 31.5–36.5)
MCV RBC AUTO: 91 FL (ref 78–100)
MONOCYTES # BLD AUTO: 0.8 10E3/UL (ref 0–1.3)
MONOCYTES NFR BLD AUTO: 7 %
NEUTROPHILS # BLD AUTO: 7.2 10E3/UL (ref 1.6–8.3)
NEUTROPHILS NFR BLD AUTO: 66 %
NRBC # BLD AUTO: 0 10E3/UL
NRBC BLD AUTO-RTO: 0 /100
PLATELET # BLD AUTO: 247 10E3/UL (ref 150–450)
POTASSIUM SERPL-SCNC: 3.5 MMOL/L (ref 3.4–5.3)
PROT SERPL-MCNC: 8 G/DL (ref 6.4–8.3)
RBC # BLD AUTO: 4.96 10E6/UL (ref 4.4–5.9)
RSV RNA SPEC NAA+PROBE: NEGATIVE
SARS-COV-2 RNA RESP QL NAA+PROBE: NEGATIVE
SODIUM SERPL-SCNC: 138 MMOL/L (ref 135–145)
WBC # BLD AUTO: 11 10E3/UL (ref 4–11)

## 2024-07-25 PROCEDURE — 250N000011 HC RX IP 250 OP 636: Performed by: FAMILY MEDICINE

## 2024-07-25 PROCEDURE — 96361 HYDRATE IV INFUSION ADD-ON: CPT | Performed by: FAMILY MEDICINE

## 2024-07-25 PROCEDURE — 99284 EMERGENCY DEPT VISIT MOD MDM: CPT | Mod: 25 | Performed by: FAMILY MEDICINE

## 2024-07-25 PROCEDURE — 96375 TX/PRO/DX INJ NEW DRUG ADDON: CPT | Performed by: FAMILY MEDICINE

## 2024-07-25 PROCEDURE — 87637 SARSCOV2&INF A&B&RSV AMP PRB: CPT | Performed by: FAMILY MEDICINE

## 2024-07-25 PROCEDURE — 85025 COMPLETE CBC W/AUTO DIFF WBC: CPT | Performed by: FAMILY MEDICINE

## 2024-07-25 PROCEDURE — 96374 THER/PROPH/DIAG INJ IV PUSH: CPT | Performed by: FAMILY MEDICINE

## 2024-07-25 PROCEDURE — 83690 ASSAY OF LIPASE: CPT | Performed by: FAMILY MEDICINE

## 2024-07-25 PROCEDURE — 258N000003 HC RX IP 258 OP 636: Performed by: FAMILY MEDICINE

## 2024-07-25 PROCEDURE — 82040 ASSAY OF SERUM ALBUMIN: CPT | Performed by: FAMILY MEDICINE

## 2024-07-25 PROCEDURE — 99284 EMERGENCY DEPT VISIT MOD MDM: CPT | Performed by: FAMILY MEDICINE

## 2024-07-25 PROCEDURE — 36415 COLL VENOUS BLD VENIPUNCTURE: CPT | Performed by: FAMILY MEDICINE

## 2024-07-25 RX ORDER — OXYCODONE HYDROCHLORIDE 5 MG/1
5 TABLET ORAL EVERY 4 HOURS PRN
Qty: 12 TABLET | Refills: 0 | Status: SHIPPED | OUTPATIENT
Start: 2024-07-25

## 2024-07-25 RX ORDER — HYDROMORPHONE HYDROCHLORIDE 1 MG/ML
0.5 INJECTION, SOLUTION INTRAMUSCULAR; INTRAVENOUS; SUBCUTANEOUS EVERY 30 MIN PRN
Status: DISCONTINUED | OUTPATIENT
Start: 2024-07-25 | End: 2024-07-25 | Stop reason: HOSPADM

## 2024-07-25 RX ORDER — LORAZEPAM 2 MG/ML
1 INJECTION INTRAMUSCULAR ONCE
Status: COMPLETED | OUTPATIENT
Start: 2024-07-25 | End: 2024-07-25

## 2024-07-25 RX ORDER — ONDANSETRON 4 MG/1
4 TABLET, ORALLY DISINTEGRATING ORAL ONCE
Status: DISCONTINUED | OUTPATIENT
Start: 2024-07-25 | End: 2024-07-25 | Stop reason: HOSPADM

## 2024-07-25 RX ORDER — ONDANSETRON 4 MG/1
4 TABLET, ORALLY DISINTEGRATING ORAL EVERY 6 HOURS PRN
Qty: 12 TABLET | Refills: 0 | Status: SHIPPED | OUTPATIENT
Start: 2024-07-25 | End: 2024-07-30

## 2024-07-25 RX ORDER — ONDANSETRON 2 MG/ML
4 INJECTION INTRAMUSCULAR; INTRAVENOUS
Status: COMPLETED | OUTPATIENT
Start: 2024-07-25 | End: 2024-07-25

## 2024-07-25 RX ADMIN — SODIUM CHLORIDE, POTASSIUM CHLORIDE, SODIUM LACTATE AND CALCIUM CHLORIDE 1000 ML: 600; 310; 30; 20 INJECTION, SOLUTION INTRAVENOUS at 05:08

## 2024-07-25 RX ADMIN — ONDANSETRON 4 MG: 2 INJECTION INTRAMUSCULAR; INTRAVENOUS at 03:48

## 2024-07-25 RX ADMIN — LORAZEPAM 1 MG: 2 INJECTION INTRAMUSCULAR; INTRAVENOUS at 04:35

## 2024-07-25 RX ADMIN — SODIUM CHLORIDE 500 ML: 9 INJECTION, SOLUTION INTRAVENOUS at 03:48

## 2024-07-25 RX ADMIN — HYDROMORPHONE HYDROCHLORIDE 0.5 MG: 1 INJECTION, SOLUTION INTRAMUSCULAR; INTRAVENOUS; SUBCUTANEOUS at 03:57

## 2024-07-25 ASSESSMENT — ACTIVITIES OF DAILY LIVING (ADL)
ADLS_ACUITY_SCORE: 35

## 2024-07-25 NOTE — DISCHARGE INSTRUCTIONS
Zofran ODT as needed for nausea.  You can use Tylenol/ibuprofen as needed for pain.  You can use the oxycodone sparingly for more severe pain if needed.  All narcotics can cause drowsiness and constipation so be careful to avoid those problems.  Take an OTC stool softener if needed.  Narcotics also have addictive potential and can actually make you more sensitive to pain in as little as 3 days so only use them for a very short time.  You should not drive or operate machinery while taking narcotics.  Small frequent amounts of fluids and you can advance as tolerated.  Expect a call from the schedulers to set you up to see gastroenterology.  You will eventually need a colonoscopy.  Please return to the ED if you worsen or have any concerns.  It was nice visiting with you this morning.  I am glad you are feeling better and hope you continue to improve.    Thank you for choosing [facility]. We appreciate the opportunity to meet your urgent medical needs. Please let us know if we could have done anything to make your stay more satisfying.    After discharge, please closely monitor for any new or worsening symptoms. Return to the Emergency Department if you develop any acute worsening signs or symptoms.    If you received an opiate pain medication or sedative during your visit, please do not drive for at least 8 hours.     If you had lab work, cultures or imaging studies done during your stay, the final results may still be pending. We will call you if your plan of care needs to change. However, if you are not improving as expected, please follow up with your primary care provider or clinic.     Start any prescription medications that were prescribed to you and take them as directed.     Please see additional handouts that may be pertinent to your condition.

## 2024-07-25 NOTE — ED TRIAGE NOTES
Pt awoke at 2am throwing up and diarrhea with severe abdominal pain, profusely sweating. Seen here yesterday for colitis.      Triage Assessment (Adult)       Row Name 07/25/24 0339          Triage Assessment    Airway WDL WDL        Respiratory WDL    Respiratory WDL WDL        Cardiac WDL    Cardiac WDL WDL        Peripheral/Neurovascular WDL    Peripheral Neurovascular WDL WDL        Cognitive/Neuro/Behavioral WDL    Cognitive/Neuro/Behavioral WDL WDL

## 2024-07-25 NOTE — ED PROVIDER NOTES
History     Chief Complaint   Patient presents with    Abdominal Pain     HPI  Gucci Khan is a 34 year old male who presents to the ED tonight with waves of cramping abdominal pain, profuse sweating, vomiting and diarrhea.  He was seen yesterday in the ED with a 1 day history of lower abdominal pain after taking the first dose of an antibiotic that was prescribed in clinic for upper respiratory issues.  He had small amount of watery stools but no blood or mucus.  CT showed evidence of colitis.  White count was 13.4, CRP was 50.37, LFTs were normal.  Stool was sent for enteric studies and came back negative.    Was sick about a year ago with joint pains and ended up getting a DVT.  That course is outlined nicely by rheumatology below.    States he had a cyst taken out the back of his head a few weeks ago.  Took the antibiotics for about 5 days but they wrecked his got.  Was good for about a week or so and ended up with cold symptoms.  Went to the clinic and got a prescription for antibiotics and took 1 dose and then developed abdominal pain which led to his visit yesterday.  Was not checked for COVID.  Others at work have been sick but not like this.        ============ excerpt from Hematology note 1/23/2024 ===============    8/4/23: The patient presented to PCP with all over joint pains over the past 3-3 weeks. WBC 11. Concern for suspected lymes, however babesia and lyme antibody panel was negative. He was stated on prednisone 40mg x5 days. He did not take prescription for doxycycline.   8/13/23: Presented to ED with persistent muscle and joint pains. CRP was elevated at 49 as measured. ESR was normal at 11. WBC 11.9. The patient was started on prednisone 60mg x 5 days and referred to rheumatology. Other labs included negative MARCIA and rheumatoid factor, Uric acid and TSH was normal.   8/22/23: Patient seen by PCP for ED follow up. Prednisone helped somewhat. Noted to have positive D-dimer and cyanosis of the  third and fifth fingers of the left hand. CK normal at 90. Ultrasound of the lower extremities was obtained the next day positive for nonocclusive DVT in the left lower extremity. He started apixiban the following day 8/24/23.   8/24/23: Seen in the ED for arm pain. US venous and arterial of the left upper extremity was negative. CTA of the chest was negative for thrombus. Limited evaluation due to bolus timing.   9/12/23: Consult with rheumatology. Differential diagnosis is broad but in terms of rheumatologic disease vasculitis is a concern. Patient was advised to stop all cannabis use and chewing tobacco. He has not used cigarettes. Thromboangiitis obliterans and cannabis arteritis would be unlikely but due to potential severity of symptoms such as gangrene/loss of limb, eliminating all potential causes was advised. CCP ab, ANCA negative. Cardiolipin antibody, beta 2 glycoprotien, lupus anticoagulant, sjogrens antibody, negative. Reflex cryoglobulin was negative. SPEP was normal.   9/15/23 Echo with 59% EF. No abnormalities visualized.   10/9/23 Repeat US of BLE with resolution of DVT. Superficial thrombophlebitis involving the left lesser saphenous vein.   10/26/23 Echo with EF of 50-55%.     01/23/2024  Patient presents today for a consult for DVT. He reports he initially presented with joint pains in August. This persisted for few weeks but then also developed some leg swelling. He was found to have DVT and started on eliquis. He denies fevers, rashes, or mouth sores. Leg swelling improved. One day he felt sudden chest pain and felt very lightheaded. Chest scans and echo have been normal.     After time of starting eliquis, he developed cyanosis multiple fingers which would resolve and come back. He notes they started as small black spots which grew. Pictures consistent with small infarcts. The fingers got very cold.     ASSESSMENT  - Nonocclusive, unprovoked deep vein thrombosis of the LLE. Diagnosed 8/23/23.  Treated with eliquis. The pt s clot happened, following an episode of joint pains and stiffness.  The exact cause of the pts acute polyarthritis is still unclear, as the pt tested negative for MARCIA and ANCA.  Interestingly, the pt developed small infarcts of the fingertips, about 1 week after the pt started with his blood thinners..     The pt was seen by dr travis from rheum.  Pt was felt to have buergers syndrome.  Pt tested negative for CCP ab, ANCA, Cardiolipin antibody, beta 2 glycoprotien, lupus anticoagulant, sjogrens antibody, negative. Reflex cryoglobulin was negative. SPEP was normal.   Of note, the pt has chewed tobacco in the past.   He does not smoke cigarettes.  He doesn't use cocaine.  He quit chewing tobacco after he was diagnosed with buergers syndrome.    Plan and recommendations for 01/23/2024  Today, we discussed pts presentation and recent unprovoked DVT extensively. Buergers syndrome could certainly explain the pts DVT as well as the pts finger tip infarcts, benedicto since we DO have a history of exposure to chewing of tobacco. Complete avoidance of any exposure to cigarettes, smoking, even hand second smoking Avoidance of second hand smoke is also strongly recommended.   Today, we ll check coagulation labs today as well as Hep B/C and HIV. Check thrombophilia panel. If all returns negative, most likely attributed to Thromboangiitis obliterans. It that case, would not recommend lifelong anticoagulation and he would be appropriate to discontinue after completing 6 months.         ==========================================    Allergies:  Allergies   Allergen Reactions    Sulfa Antibiotics Rash    Zithromax [Azithromycin] Rash       Problem List:    There are no problems to display for this patient.       Past Medical History:    No past medical history on file.    Past Surgical History:    No past surgical history on file.    Family History:    No family history on file.    Social History:  Marital  "Status:  Single [1]  Social History     Tobacco Use    Smoking status: Never    Smokeless tobacco: Current     Types: Chew   Substance Use Topics    Alcohol use: Not Currently     Comment: occ    Drug use: Not Currently     Types: Marijuana     Comment: from time to time        Medications:    ondansetron (ZOFRAN ODT) 4 MG ODT tab  oxyCODONE (ROXICODONE) 5 MG tablet  acetaminophen (TYLENOL) 500 MG tablet  ibuprofen (ADVIL/MOTRIN) 200 MG tablet  PARoxetine (PAXIL-CR) 37.5 MG 24 hr tablet          Review of Systems   All other systems reviewed and are negative.      Physical Exam   BP: (!) 172/146  Pulse: 107  Temp: 98.7  F (37.1  C)  Resp: 22  Height: 195.6 cm (6' 5\")  SpO2: 99 %      Physical Exam  Constitutional:       Appearance: He is diaphoretic.   Cardiovascular:      Rate and Rhythm: Regular rhythm. Tachycardia present.   Pulmonary:      Effort: Pulmonary effort is normal. No respiratory distress.      Breath sounds: Normal breath sounds.   Abdominal:      Tenderness: There is generalized abdominal tenderness (mild).   Skin:     Coloration: Skin is pale.   Neurological:      General: No focal deficit present.      Mental Status: He is alert and oriented to person, place, and time.         ED Course        Procedures              Critical Care time:  none               Results for orders placed or performed during the hospital encounter of 07/25/24 (from the past 24 hour(s))   CBC with Platelets & Differential    Narrative    The following orders were created for panel order CBC with Platelets & Differential.  Procedure                               Abnormality         Status                     ---------                               -----------         ------                     CBC with platelets and d...[[id number]]  Abnormal            Final result                 Please view results for these tests on the individual orders.   Comprehensive metabolic panel   Result Value Ref Range    Sodium 138 135 - 145 " mmol/L    Potassium 3.5 3.4 - 5.3 mmol/L    Carbon Dioxide (CO2) 17 (L) 22 - 29 mmol/L    Anion Gap 16 (H) 7 - 15 mmol/L    Urea Nitrogen 9.7 6.0 - 20.0 mg/dL    Creatinine 0.99 0.67 - 1.17 mg/dL    GFR Estimate >90 >60 mL/min/1.73m2    Calcium 9.5 8.8 - 10.4 mg/dL    Chloride 105 98 - 107 mmol/L    Glucose 148 (H) 70 - 99 mg/dL    Alkaline Phosphatase 108 40 - 150 U/L    AST 20 0 - 45 U/L    ALT 24 0 - 70 U/L    Protein Total 8.0 6.4 - 8.3 g/dL    Albumin 4.4 3.5 - 5.2 g/dL    Bilirubin Total 0.5 <=1.2 mg/dL   CBC with platelets and differential   Result Value Ref Range    WBC Count 11.0 4.0 - 11.0 10e3/uL    RBC Count 4.96 4.40 - 5.90 10e6/uL    Hemoglobin 16.4 13.3 - 17.7 g/dL    Hematocrit 44.9 40.0 - 53.0 %    MCV 91 78 - 100 fL    MCH 33.1 (H) 26.5 - 33.0 pg    MCHC 36.5 31.5 - 36.5 g/dL    RDW 12.3 10.0 - 15.0 %    Platelet Count 247 150 - 450 10e3/uL    % Neutrophils 66 %    % Lymphocytes 25 %    % Monocytes 7 %    % Eosinophils 2 %    % Basophils 1 %    % Immature Granulocytes 0 %    NRBCs per 100 WBC 0 <1 /100    Absolute Neutrophils 7.2 1.6 - 8.3 10e3/uL    Absolute Lymphocytes 2.7 0.8 - 5.3 10e3/uL    Absolute Monocytes 0.8 0.0 - 1.3 10e3/uL    Absolute Eosinophils 0.2 0.0 - 0.7 10e3/uL    Absolute Basophils 0.1 0.0 - 0.2 10e3/uL    Absolute Immature Granulocytes 0.0 <=0.4 10e3/uL    Absolute NRBCs 0.0 10e3/uL   Lipase   Result Value Ref Range    Lipase 14 13 - 60 U/L   Symptomatic Influenza A/B, RSV, & SARS-CoV2 PCR (COVID-19) Nose    Specimen: Nose; Swab   Result Value Ref Range    Influenza A PCR Negative Negative    Influenza B PCR Negative Negative    RSV PCR Negative Negative    SARS CoV2 PCR Negative Negative    Narrative    Testing was performed using the Xpert Xpress CoV2/Flu/RSV Assay on the [employer] Instrument. This test should be ordered for the detection of SARS-CoV-2, influenza, and RSV viruses in individuals who meet clinical and/or epidemiological criteria. Test performance is  unknown in asymptomatic patients. This test is for in vitro diagnostic use under the FDA EUA for laboratories certified under CLIA to perform high or moderate complexity testing. This test has not been FDA cleared or approved. A negative result does not rule out the presence of PCR inhibitors in the specimen or target RNA in concentration below the limit of detection for the assay. If only one viral target is positive but coinfection with multiple targets is suspected, the sample should be re-tested with another FDA cleared, approved, or authorized test, if coinfection would change clinical management. This test was validated by the [facility]. These laboratories are certified under the Clinical Laboratory Improvement Amendments of 1988 (CLIA-88) as qualified to perform high complexity laboratory testing.       Medications   ondansetron (ZOFRAN ODT) ODT tab 4 mg (has no administration in time range)   HYDROmorphone (PF) (DILAUDID) injection 0.5 mg (0 mg Intravenous Return to Cabinet 7/25/24 0435)   ondansetron (ZOFRAN) injection 4 mg (4 mg Intravenous $Given 7/25/24 0348)   sodium chloride 0.9% BOLUS 500 mL (0 mLs Intravenous Stopped 7/25/24 0428)   LORazepam (ATIVAN) injection 1 mg (1 mg Intravenous $Given 7/25/24 0435)   lactated ringers BOLUS 1,000 mL (0 mLs Intravenous Stopped 7/25/24 0607)       Assessments & Plan (with Medical Decision Making)  34-year-old seen yesterday with colitis now having waves of abdominal pain nausea and vomiting.  Also some diarrhea.  Came in diaphoretic and quite uncomfortable.  Now in between waves of pain.  IV placed.  Dilaudid for pain.  Zofran for nausea.  1 L normal saline.  Will repeat his labs.  Enteric bacteria and virus panel was negative yesterday.  White count actually down a little bit today at 11.0.  Comprehensive profile markable except glucose of 148.  Lipase normal.  He is feeling a lot better after the Zofran, fluids, Dilaudid and Ativan.  He  would like to try sips of fluids.  In the meantime we will give him a second liter of crystalloid.  If he worsens, consider rescanning his abdomen as he did have some unusual vascular issues going on about a year ago as outlined above.  Would be very unusual for someone his age to have an ischemic colitis.  COVID and influenza negative.  6:28 AM: He is feeling quite a bit better and was able to tolerate oral liquids.  Did not have any increased abdominal pain nausea or vomiting with oral fluid intake.  He wants to give it a try at home and see how it goes.  Prescription for Zofran ODT and limited prescription of oxycodone were sent to his pharmacy that he can use as needed.  GI consult was ordered as an outpatient.  We discussed reportable signs when to return.  Verbal discharge instruction given.  He is comfortable this plan.       I have reviewed the nursing notes.    I have reviewed the findings, diagnosis, plan and need for follow up with the patient.           Medical Decision Making  The patient's presentation was of high complexity (a chronic illness severe exacerbation, progression, or side effect of treatment).    The patient's evaluation involved:  review of external note(s) from 1 sources (see separate area of note for details)  review of 3+ test result(s) ordered prior to this encounter (see separate area of note for details)  ordering and/or review of 3+ test(s) in this encounter (see separate area of note for details)    The patient's management necessitated high risk (a parenteral controlled substance).        New Prescriptions    ONDANSETRON (ZOFRAN ODT) 4 MG ODT TAB    Take 1 tablet (4 mg) by mouth every 6 hours as needed for nausea    OXYCODONE (ROXICODONE) 5 MG TABLET    Take 1 tablet (5 mg) by mouth every 4 hours as needed for severe pain       Final diagnoses:   Nausea vomiting and diarrhea   Colitis   Generalized abdominal pain       7/25/2024   [facility] EMERGENCY DEPT        Mike Escobedo, Mitul Negron MD  07/25/24 0630

## 2024-07-26 ENCOUNTER — TELEPHONE (OUTPATIENT)
Dept: GASTROENTEROLOGY | Facility: CLINIC | Age: 34
End: 2024-07-26
Payer: COMMERCIAL

## 2024-07-26 NOTE — TELEPHONE ENCOUNTER
[facility] Call Center    Phone Message    May a detailed message be left on voicemail: Yes    Reason for Call: Other: Patient is currently scheduled on 9/16, as visit type New GI Urgent. This is outside the expected timeline for this referral. Patient has been added to the waitlist.      Action Taken: Message routed to:  Other: GI REFERRAL TRIAGE POOL     Travel Screening: Not Applicable

## 2024-07-30 ENCOUNTER — APPOINTMENT (OUTPATIENT)
Dept: CT IMAGING | Facility: CLINIC | Age: 34
End: 2024-07-30
Attending: NURSE PRACTITIONER
Payer: COMMERCIAL

## 2024-07-30 ENCOUNTER — HOSPITAL ENCOUNTER (EMERGENCY)
Facility: CLINIC | Age: 34
Discharge: HOME | End: 2024-07-30
Attending: NURSE PRACTITIONER | Admitting: NURSE PRACTITIONER
Payer: COMMERCIAL

## 2024-07-30 VITALS
SYSTOLIC BLOOD PRESSURE: 128 MMHG | DIASTOLIC BLOOD PRESSURE: 66 MMHG | BODY MASS INDEX: 33.52 KG/M2 | RESPIRATION RATE: 18 BRPM | OXYGEN SATURATION: 96 % | HEART RATE: 78 BPM | HEIGHT: 77 IN | WEIGHT: 283.9 LBS | TEMPERATURE: 98.5 F

## 2024-07-30 DIAGNOSIS — K52.9 NON-SPECIFIC COLITIS: ICD-10-CM

## 2024-07-30 DIAGNOSIS — E86.0 MILD DEHYDRATION: ICD-10-CM

## 2024-07-30 LAB
ALBUMIN SERPL BCG-MCNC: 4.2 G/DL (ref 3.5–5.2)
ALBUMIN UR-MCNC: NEGATIVE MG/DL
ALP SERPL-CCNC: 101 U/L (ref 40–150)
ALT SERPL W P-5'-P-CCNC: 33 U/L (ref 0–70)
ANION GAP SERPL CALCULATED.3IONS-SCNC: 14 MMOL/L (ref 7–15)
APPEARANCE UR: CLEAR
AST SERPL W P-5'-P-CCNC: 24 U/L (ref 0–45)
BACTERIA #/AREA URNS HPF: ABNORMAL /HPF
BASOPHILS # BLD AUTO: 0.1 10E3/UL (ref 0–0.2)
BASOPHILS NFR BLD AUTO: 0 %
BILIRUB SERPL-MCNC: 0.4 MG/DL
BILIRUB UR QL STRIP: NEGATIVE
BUN SERPL-MCNC: 12.5 MG/DL (ref 6–20)
C DIFF GDH STL QL IA: POSITIVE
C DIFF TOX A+B STL QL IA: NEGATIVE
C DIFF TOX B STL QL: POSITIVE
CALCIUM SERPL-MCNC: 9.4 MG/DL (ref 8.8–10.4)
CHLORIDE SERPL-SCNC: 104 MMOL/L (ref 98–107)
COLOR UR AUTO: YELLOW
CREAT SERPL-MCNC: 0.98 MG/DL (ref 0.67–1.17)
CRP SERPL-MCNC: 8.74 MG/L
EGFRCR SERPLBLD CKD-EPI 2021: >90 ML/MIN/1.73M2
EOSINOPHIL # BLD AUTO: 0.1 10E3/UL (ref 0–0.7)
EOSINOPHIL NFR BLD AUTO: 1 %
ERYTHROCYTE [DISTWIDTH] IN BLOOD BY AUTOMATED COUNT: 11.9 % (ref 10–15)
GLUCOSE SERPL-MCNC: 131 MG/DL (ref 70–99)
GLUCOSE UR STRIP-MCNC: NEGATIVE MG/DL
HCO3 SERPL-SCNC: 21 MMOL/L (ref 22–29)
HCT VFR BLD AUTO: 44.2 % (ref 40–53)
HGB BLD-MCNC: 16.1 G/DL (ref 13.3–17.7)
HGB UR QL STRIP: NEGATIVE
IMM GRANULOCYTES # BLD: 0.1 10E3/UL
IMM GRANULOCYTES NFR BLD: 1 %
KETONES UR STRIP-MCNC: NEGATIVE MG/DL
LACTATE SERPL-SCNC: 0.8 MMOL/L (ref 0.7–2)
LEUKOCYTE ESTERASE UR QL STRIP: NEGATIVE
LIPASE SERPL-CCNC: 13 U/L (ref 13–60)
LYMPHOCYTES # BLD AUTO: 1.9 10E3/UL (ref 0.8–5.3)
LYMPHOCYTES NFR BLD AUTO: 12 %
MCH RBC QN AUTO: 32.9 PG (ref 26.5–33)
MCHC RBC AUTO-ENTMCNC: 36.4 G/DL (ref 31.5–36.5)
MCV RBC AUTO: 90 FL (ref 78–100)
MONOCYTES # BLD AUTO: 0.6 10E3/UL (ref 0–1.3)
MONOCYTES NFR BLD AUTO: 4 %
MUCOUS THREADS #/AREA URNS LPF: PRESENT /LPF
NEUTROPHILS # BLD AUTO: 12.6 10E3/UL (ref 1.6–8.3)
NEUTROPHILS NFR BLD AUTO: 82 %
NITRATE UR QL: NEGATIVE
NRBC # BLD AUTO: 0 10E3/UL
NRBC BLD AUTO-RTO: 0 /100
PH UR STRIP: 6 [PH] (ref 5–7)
PLATELET # BLD AUTO: 299 10E3/UL (ref 150–450)
POTASSIUM SERPL-SCNC: 4 MMOL/L (ref 3.4–5.3)
PROT SERPL-MCNC: 7.6 G/DL (ref 6.4–8.3)
RBC # BLD AUTO: 4.9 10E6/UL (ref 4.4–5.9)
RBC URINE: 2 /HPF
SODIUM SERPL-SCNC: 139 MMOL/L (ref 135–145)
SP GR UR STRIP: 1.01 (ref 1–1.03)
UROBILINOGEN UR STRIP-MCNC: NORMAL MG/DL
WBC # BLD AUTO: 15.3 10E3/UL (ref 4–11)
WBC URINE: <1 /HPF

## 2024-07-30 PROCEDURE — 250N000011 HC RX IP 250 OP 636: Performed by: NURSE PRACTITIONER

## 2024-07-30 PROCEDURE — 99284 EMERGENCY DEPT VISIT MOD MDM: CPT | Performed by: NURSE PRACTITIONER

## 2024-07-30 PROCEDURE — 85025 COMPLETE CBC W/AUTO DIFF WBC: CPT | Performed by: NURSE PRACTITIONER

## 2024-07-30 PROCEDURE — 36415 COLL VENOUS BLD VENIPUNCTURE: CPT | Performed by: NURSE PRACTITIONER

## 2024-07-30 PROCEDURE — 99285 EMERGENCY DEPT VISIT HI MDM: CPT | Mod: 25 | Performed by: NURSE PRACTITIONER

## 2024-07-30 PROCEDURE — 86140 C-REACTIVE PROTEIN: CPT | Performed by: NURSE PRACTITIONER

## 2024-07-30 PROCEDURE — 82040 ASSAY OF SERUM ALBUMIN: CPT | Performed by: NURSE PRACTITIONER

## 2024-07-30 PROCEDURE — 250N000009 HC RX 250: Performed by: NURSE PRACTITIONER

## 2024-07-30 PROCEDURE — 81001 URINALYSIS AUTO W/SCOPE: CPT | Performed by: NURSE PRACTITIONER

## 2024-07-30 PROCEDURE — 83605 ASSAY OF LACTIC ACID: CPT | Performed by: NURSE PRACTITIONER

## 2024-07-30 PROCEDURE — 87493 C DIFF AMPLIFIED PROBE: CPT | Performed by: NURSE PRACTITIONER

## 2024-07-30 PROCEDURE — 82374 ASSAY BLOOD CARBON DIOXIDE: CPT | Performed by: NURSE PRACTITIONER

## 2024-07-30 PROCEDURE — 96374 THER/PROPH/DIAG INJ IV PUSH: CPT | Mod: 59 | Performed by: NURSE PRACTITIONER

## 2024-07-30 PROCEDURE — 83690 ASSAY OF LIPASE: CPT | Performed by: NURSE PRACTITIONER

## 2024-07-30 PROCEDURE — 87324 CLOSTRIDIUM AG IA: CPT | Mod: XU | Performed by: NURSE PRACTITIONER

## 2024-07-30 PROCEDURE — 258N000003 HC RX IP 258 OP 636: Performed by: NURSE PRACTITIONER

## 2024-07-30 PROCEDURE — 74177 CT ABD & PELVIS W/CONTRAST: CPT

## 2024-07-30 RX ORDER — KETOROLAC TROMETHAMINE 15 MG/ML
15 INJECTION, SOLUTION INTRAMUSCULAR; INTRAVENOUS ONCE
Status: COMPLETED | OUTPATIENT
Start: 2024-07-30 | End: 2024-07-30

## 2024-07-30 RX ORDER — IOPAMIDOL 755 MG/ML
500 INJECTION, SOLUTION INTRAVASCULAR ONCE
Status: COMPLETED | OUTPATIENT
Start: 2024-07-30 | End: 2024-07-30

## 2024-07-30 RX ORDER — ONDANSETRON 2 MG/ML
4 INJECTION INTRAMUSCULAR; INTRAVENOUS ONCE
Status: DISCONTINUED | OUTPATIENT
Start: 2024-07-30 | End: 2024-07-30 | Stop reason: HOSPADM

## 2024-07-30 RX ADMIN — SODIUM CHLORIDE 1000 ML: 9 INJECTION, SOLUTION INTRAVENOUS at 11:59

## 2024-07-30 RX ADMIN — IOPAMIDOL 100 ML: 755 INJECTION, SOLUTION INTRAVENOUS at 12:12

## 2024-07-30 RX ADMIN — KETOROLAC TROMETHAMINE 15 MG: 15 INJECTION, SOLUTION INTRAMUSCULAR; INTRAVENOUS at 12:03

## 2024-07-30 RX ADMIN — SODIUM CHLORIDE 60 ML: 9 INJECTION, SOLUTION INTRAVENOUS at 12:12

## 2024-07-30 ASSESSMENT — ACTIVITIES OF DAILY LIVING (ADL)
ADLS_ACUITY_SCORE: 35
ADLS_ACUITY_SCORE: 35

## 2024-07-30 ASSESSMENT — ENCOUNTER SYMPTOMS
NAUSEA: 1
MUSCULOSKELETAL NEGATIVE: 1
CONSTIPATION: 0
DYSURIA: 0
ABDOMINAL PAIN: 1
FEVER: 0
VOMITING: 1
BLOOD IN STOOL: 0
DIARRHEA: 1
NEUROLOGICAL NEGATIVE: 1
DIAPHORESIS: 1
COUGH: 0

## 2024-07-30 ASSESSMENT — COLUMBIA-SUICIDE SEVERITY RATING SCALE - C-SSRS
6. HAVE YOU EVER DONE ANYTHING, STARTED TO DO ANYTHING, OR PREPARED TO DO ANYTHING TO END YOUR LIFE?: NO
1. IN THE PAST MONTH, HAVE YOU WISHED YOU WERE DEAD OR WISHED YOU COULD GO TO SLEEP AND NOT WAKE UP?: NO
2. HAVE YOU ACTUALLY HAD ANY THOUGHTS OF KILLING YOURSELF IN THE PAST MONTH?: NO

## 2024-07-30 NOTE — ED PROVIDER NOTES
History     Chief Complaint   Patient presents with    Abdominal Pain     HPI  Gucci Khan is a 34 year old male with history of hypertension, GERD, irritable bowel syndrome, buerger's syndrome/DVT, and anxiety who presents for evaluation of abdominal pain.   This is his 3rd ED visit for abdominal pain and diarrhea in the last week.  He developed pain 6 days ago in the lower abdomen with associated numerous watery stools.  He did have a cyst removed from his head a few weeks ago and took antibiotics for about 5 days which did give him upset stomach.  He was seen in clinic for some cold symptoms a couple weeks ago and was also prescribed antibiotics and took 1 dose and developed abdominal pain prompting him to come to the emergency department on 7/24/2024.   Abdominal/pelvis CT without contrast showing mild mural thickening and mesenteric edema concerning for colitis.  Mildly elevated CRP and leukocytosis.  He was symptomatically treated with IV fluids, IV Zofran, and IV Toradol.  Symptoms improved.      He returned the next day, 7/25/2024, with persistent nausea, vomiting, and diarrhea.  Stool studies have all been negative.  He had repeat labs which were improved.  He was treated with IV fluids, IV Zofran, IV Dilaudid, and IV Ativan.  He felt significantly better and it was felt because of this he did not need repeat imaging.  However, if his pain were to worsen then consideration for repeat imaging.      He returns today with persistent abdominal pain. Pain is focal to the suprapubic region. Waxes and wanes in severity.  Continues to have nausea, vomiting, and diarrhea. Tried drinking Gatorade this morning and vomited shortly after.  Denies black or bloody stools. Denies fever, but has been having sweats.     Prior abdominal surgeries include appendectomy as a child.  Patient has no concern for STI, no history of anal intercourse.      Of note, he had a unprovoked DVT last year was evaluated by hematology that  nicely outlines the work-up and findings leading to diagnosis of Buerger's Syndrome. See note below.    ============ excerpt from Hematology note 1/23/2024 ===============     8/4/23: The patient presented to PCP with all over joint pains over the past 3-3 weeks. WBC 11. Concern for suspected lymes, however babesia and lyme antibody panel was negative. He was stated on prednisone 40mg x5 days. He did not take prescription for doxycycline.   8/13/23: Presented to ED with persistent muscle and joint pains. CRP was elevated at 49 as measured. ESR was normal at 11. WBC 11.9. The patient was started on prednisone 60mg x 5 days and referred to rheumatology. Other labs included negative MARCIA and rheumatoid factor, Uric acid and TSH was normal.   8/22/23: Patient seen by PCP for ED follow up. Prednisone helped somewhat. Noted to have positive D-dimer and cyanosis of the third and fifth fingers of the left hand. CK normal at 90. Ultrasound of the lower extremities was obtained the next day positive for nonocclusive DVT in the left lower extremity. He started apixiban the following day 8/24/23.   8/24/23: Seen in the ED for arm pain. US venous and arterial of the left upper extremity was negative. CTA of the chest was negative for thrombus. Limited evaluation due to bolus timing.   9/12/23: Consult with rheumatology. Differential diagnosis is broad but in terms of rheumatologic disease vasculitis is a concern. Patient was advised to stop all cannabis use and chewing tobacco. He has not used cigarettes. Thromboangiitis obliterans and cannabis arteritis would be unlikely but due to potential severity of symptoms such as gangrene/loss of limb, eliminating all potential causes was advised. CCP ab, ANCA negative. Cardiolipin antibody, beta 2 glycoprotien, lupus anticoagulant, sjogrens antibody, negative. Reflex cryoglobulin was negative. SPEP was normal.   9/15/23 Echo with 59% EF. No abnormalities visualized.   10/9/23 Repeat US  of BLE with resolution of DVT. Superficial thrombophlebitis involving the left lesser saphenous vein.   10/26/23 Echo with EF of 50-55%.     01/23/2024  Patient presents today for a consult for DVT. He reports he initially presented with joint pains in August. This persisted for few weeks but then also developed some leg swelling. He was found to have DVT and started on eliquis. He denies fevers, rashes, or mouth sores. Leg swelling improved. One day he felt sudden chest pain and felt very lightheaded. Chest scans and echo have been normal.     After time of starting eliquis, he developed cyanosis multiple fingers which would resolve and come back. He notes they started as small black spots which grew. Pictures consistent with small infarcts. The fingers got very cold.      ASSESSMENT  - Nonocclusive, unprovoked deep vein thrombosis of the LLE. Diagnosed 8/23/23. Treated with eliquis. The pt s clot happened, following an episode of joint pains and stiffness.  The exact cause of the pts acute polyarthritis is still unclear, as the pt tested negative for MARCIA and ANCA.  Interestingly, the pt developed small infarcts of the fingertips, about 1 week after the pt started with his blood thinners..     The pt was seen by dr travis from rheum.  Pt was felt to have buergers syndrome.  Pt tested negative for CCP ab, ANCA, Cardiolipin antibody, beta 2 glycoprotien, lupus anticoagulant, sjogrens antibody, negative. Reflex cryoglobulin was negative. SPEP was normal.   Of note, the pt has chewed tobacco in the past.   He does not smoke cigarettes.  He doesn't use cocaine.  He quit chewing tobacco after he was diagnosed with buergers syndrome.    Plan and recommendations for 01/23/2024  Today, we discussed pts presentation and recent unprovoked DVT extensively. Buergers syndrome could certainly explain the pts DVT as well as the pts finger tip infarcts, benedicto since we DO have a history of exposure to chewing of tobacco. Complete  avoidance of any exposure to cigarettes, smoking, even hand second smoking Avoidance of second hand smoke is also strongly recommended.   Today, we ll check coagulation labs today as well as Hep B/C and HIV. Check thrombophilia panel. If all returns negative, most likely attributed to Thromboangiitis obliterans. It that case, would not recommend lifelong anticoagulation and he would be appropriate to discontinue after completing 6 months.       Allergies:  Allergies   Allergen Reactions    Sulfa Antibiotics Rash    Zithromax [Azithromycin] Rash       Problem List:    There are no problems to display for this patient.       Past Medical History:    History reviewed. No pertinent past medical history.    Past Surgical History:    History reviewed. No pertinent surgical history.    Family History:    No family history on file.    Social History:  Marital Status:  Single [1]  Social History     Tobacco Use    Smoking status: Never    Smokeless tobacco: Current     Types: Chew   Substance Use Topics    Alcohol use: Not Currently     Comment: occ    Drug use: Not Currently     Types: Marijuana     Comment: from time to time        Medications:    acetaminophen (TYLENOL) 500 MG tablet  ibuprofen (ADVIL/MOTRIN) 200 MG tablet  ondansetron (ZOFRAN ODT) 4 MG ODT tab  oxyCODONE (ROXICODONE) 5 MG tablet  PARoxetine (PAXIL-CR) 37.5 MG 24 hr tablet          Review of Systems   Constitutional:  Positive for diaphoresis. Negative for fever.   HENT:  Negative for congestion.    Respiratory:  Negative for cough.    Cardiovascular:  Negative for chest pain.   Gastrointestinal:  Positive for abdominal pain, diarrhea, nausea and vomiting. Negative for blood in stool and constipation.   Genitourinary:  Positive for decreased urine volume. Negative for dysuria.   Musculoskeletal: Negative.    Skin: Negative.    Neurological: Negative.    All other systems reviewed and are negative.      Physical Exam   BP: (!) 147/100  Pulse: 102  Temp:  "98.5  F (36.9  C)  Resp: 20  Height: 195.6 cm (6' 5\")  Weight: 128.8 kg (283 lb 14.4 oz)  SpO2: 95 %      Physical Exam  Constitutional:       General: He is not in acute distress.     Appearance: He is well-developed. He is obese. He is not ill-appearing.   HENT:      Head: Normocephalic and atraumatic.      Right Ear: External ear normal.      Left Ear: External ear normal.      Nose: Nose normal.      Mouth/Throat:      Mouth: Mucous membranes are moist.   Eyes:      Conjunctiva/sclera: Conjunctivae normal.   Cardiovascular:      Rate and Rhythm: Regular rhythm. Tachycardia present.      Heart sounds: Normal heart sounds. No murmur heard.  Pulmonary:      Effort: Pulmonary effort is normal. No respiratory distress.      Breath sounds: Normal breath sounds.   Abdominal:      General: Bowel sounds are normal. There is no distension.      Palpations: Abdomen is soft.      Tenderness: There is abdominal tenderness in the suprapubic area.   Musculoskeletal:         General: Normal range of motion.   Skin:     General: Skin is warm and dry.      Findings: No rash.   Neurological:      General: No focal deficit present.      Mental Status: He is alert and oriented to person, place, and time.         ED Course        Procedures         Results for orders placed or performed during the hospital encounter of 07/30/24 (from the past 24 hour(s))   CBC with platelets differential    Narrative    The following orders were created for panel order CBC with platelets differential.  Procedure                               Abnormality         Status                     ---------                               -----------         ------                     CBC with platelets and d...[[id number]]  Abnormal            Final result                 Please view results for these tests on the individual orders.   Comprehensive metabolic panel   Result Value Ref Range    Sodium 139 135 - 145 mmol/L    Potassium 4.0 3.4 - 5.3 mmol/L    Carbon " Dioxide (CO2) 21 (L) 22 - 29 mmol/L    Anion Gap 14 7 - 15 mmol/L    Urea Nitrogen 12.5 6.0 - 20.0 mg/dL    Creatinine 0.98 0.67 - 1.17 mg/dL    GFR Estimate >90 >60 mL/min/1.73m2    Calcium 9.4 8.8 - 10.4 mg/dL    Chloride 104 98 - 107 mmol/L    Glucose 131 (H) 70 - 99 mg/dL    Alkaline Phosphatase 101 40 - 150 U/L    AST 24 0 - 45 U/L    ALT 33 0 - 70 U/L    Protein Total 7.6 6.4 - 8.3 g/dL    Albumin 4.2 3.5 - 5.2 g/dL    Bilirubin Total 0.4 <=1.2 mg/dL   Lipase   Result Value Ref Range    Lipase 13 13 - 60 U/L   Lactic acid whole blood with 1x repeat in 2 hr when >2   Result Value Ref Range    Lactic Acid, Initial 0.8 0.7 - 2.0 mmol/L   CRP inflammation   Result Value Ref Range    CRP Inflammation 8.74 (H) <5.00 mg/L   CBC with platelets and differential   Result Value Ref Range    WBC Count 15.3 (H) 4.0 - 11.0 10e3/uL    RBC Count 4.90 4.40 - 5.90 10e6/uL    Hemoglobin 16.1 13.3 - 17.7 g/dL    Hematocrit 44.2 40.0 - 53.0 %    MCV 90 78 - 100 fL    MCH 32.9 26.5 - 33.0 pg    MCHC 36.4 31.5 - 36.5 g/dL    RDW 11.9 10.0 - 15.0 %    Platelet Count 299 150 - 450 10e3/uL    % Neutrophils 82 %    % Lymphocytes 12 %    % Monocytes 4 %    % Eosinophils 1 %    % Basophils 0 %    % Immature Granulocytes 1 %    NRBCs per 100 WBC 0 <1 /100    Absolute Neutrophils 12.6 (H) 1.6 - 8.3 10e3/uL    Absolute Lymphocytes 1.9 0.8 - 5.3 10e3/uL    Absolute Monocytes 0.6 0.0 - 1.3 10e3/uL    Absolute Eosinophils 0.1 0.0 - 0.7 10e3/uL    Absolute Basophils 0.1 0.0 - 0.2 10e3/uL    Absolute Immature Granulocytes 0.1 <=0.4 10e3/uL    Absolute NRBCs 0.0 10e3/uL   CT Abdomen Pelvis w Contrast    Narrative    CT ABDOMEN PELVIS W CONTRAST 7/30/2024 12:19 PM    CLINICAL HISTORY: suprapubic abdominal pain, vomiting and diarrhea.    TECHNIQUE: CT scan of the abdomen and pelvis was performed following  injection of IV contrast. Multiplanar reformats were obtained. Dose  reduction techniques were used.  CONTRAST: ISOVUE-370, 100 mL    COMPARISON:  CT abdomen and pelvis performed on 7/24/2024    FINDINGS:   LOWER CHEST: Unremarkable.    HEPATOBILIARY: Focal fatty infiltration is seen adjacent to the  falciform ligament. Gallbladder is unremarkable.    PANCREAS: No significant mass, duct dilatation, or inflammatory  change.    SPLEEN: Normal size.    ADRENAL GLANDS: No significant nodules.    KIDNEYS/BLADDER: No significant mass, stones, or hydronephrosis.  Diffuse wall thickening is present within the urinary bladder.    BOWEL: Mild, diffuse wall thickening is seen along the rectum and  sigmoid colon, similar to the prior exam. Previously seen wall  thickening along the ascending, transverse and descending colon  appears improved however. No evidence of bowel obstruction is present.    PELVIC ORGANS: No pelvic masses.    ADDITIONAL FINDINGS: Stable subcentimeter retroperitoneal and inguinal  lymph nodes measuring up to 7 mm on the left periaortic space,  nonspecific and likely reactive.    MUSCULOSKELETAL: Unremarkable.      Impression    IMPRESSION:   1.  Diffuse wall thickening is seen along the rectum and sigmoid  colon, similar in appearance to the prior exam and suggestive of  proctocolitis.  2.  Wall thickening along the ascending, transverse and descending  colon appears improved suggestive of improved colitis in these areas.  3.  Diffuse wall thickening is present within the urinary bladder  suggestive of cystitis. Recommend correlation with urinalysis.    FITO LANCASTER MD         SYSTEM ID:  [id number]   UA with Microscopic reflex to Culture    Specimen: Urine, Clean Catch   Result Value Ref Range    Color Urine Yellow Colorless, Straw, Light Yellow, Yellow    Appearance Urine Clear Clear    Glucose Urine Negative Negative mg/dL    Bilirubin Urine Negative Negative    Ketones Urine Negative Negative mg/dL    Specific Gravity Urine 1.010 1.003 - 1.035    Blood Urine Negative Negative    pH Urine 6.0 5.0 - 7.0    Protein Albumin Urine Negative Negative  mg/dL    Urobilinogen Urine Normal Normal, 2.0 mg/dL    Nitrite Urine Negative Negative    Leukocyte Esterase Urine Negative Negative    Bacteria Urine Few (A) None Seen /HPF    Mucus Urine Present (A) None Seen /LPF    RBC Urine 2 <=2 /HPF    WBC Urine <1 <=5 /HPF    Narrative    Urine Culture not indicated  Urine Culture not indicated       Medications   ondansetron (ZOFRAN) injection 4 mg (has no administration in time range)   sodium chloride 0.9% BOLUS 1,000 mL (1,000 mLs Intravenous $New Bag 7/30/24 1159)   ketorolac (TORADOL) injection 15 mg (15 mg Intravenous $Given 7/30/24 1203)   sodium chloride 0.9 % bag 100mL for CT scan flush use (60 mLs Intravenous $Given 7/30/24 1212)   iopamidol (ISOVUE-370) solution 500 mL (100 mLs Intravenous $Given 7/30/24 1212)       Assessments & Plan (with Medical Decision Making)   34 year old male with nonspecific colitis, symptoms starting about 1 week ago with lower abdominal pain, vomiting, diarrhea.  No black or bloody stools.  Seen here on 7/24/2024 and 7/25/2024.  Prior workup with abdominal/pelvis CT without contrast showing diffuse colitis.  Mildly elevated WBC which was normalized on the second visit.  Stool culture for material/viral enteric pathogen was negative.  Returns here today for persistent symptoms and decreased urination.  He has been on recent antibiotics (Augmentin) in the last few weeks for cold symptoms.    On arrival he is slightly hypertensive.  Afebrile.  Mild tachycardia with heart rate 102 bpm.  He has focal tenderness to the suprapubic region.  Otherwise abdomen soft and nondistended.    Abdominal exam without peritoneal signs. No evidence of acute abdomen at this time. Well appearing.   Today his workup is reassuring.  Abdominal/pelvis CT with contrast shows diffuse wall thickening along the rectum and sigmoid colon which is similar in appearance to prior exam.  Suggestive of proctocolitis.  However, the wall thickening along the ascending,  transverse, and descending colon have improved.  There is diffuse wall thickening of the urinary bladder suggestive of cystitis.  No evidence of bowel obstruction.  No perforation.  No abscess.  He has had prior appendectomy.    WBC is elevated today at 15.3, but CRP is trending down 8.74.  This is nonspecific, and could certainly be related to dehydration.  He has normal lactic acid.  I have low suspicion for ischemic colitis.  Urinalysis is negative for infection.  I did consider possibly C. difficile given his recent antibiotics.  Specimen obrtained prior to discharged and is pending.    Certainly all of his symptoms could be just a resolving viral gastroenteritis.  He did have evidence of mild dehydration with mild tachycardia and his heart rate is now normalized after IV fluids.  I recommend follow-up with gastroenterology.  Consideration for a colonoscopy. He has an appointment in September.  He has appt with PCP tomorrow for recheck.   He still has some Zofran at home to use for nausea.  I recommend frequent sips of fluids and start with clear liquids and advancing very slowly as he tolerates.      Plan as follows:  Follow-up for recheck in clinic tomorrow as scheduled.  Follow-up with gastroenterology as scheduled and discuss getting set up for a colonoscopy for further evaluation.  Stool obtain for C. difficile testing, results is pending.  If it comes back positive you will be contacted.    Return to the emergency department for fevers, vomiting and unable to keep fluids down, or bloody stool, or worse in any way.    Discharge Medication List as of 7/30/2024  1:55 PM          Final diagnoses:   Non-specific colitis   Mild dehydration       7/30/2024   [facility] EMERGENCY DEPT       Madalyn Vela APRN CNP  07/30/24 [id number]

## 2024-07-30 NOTE — DISCHARGE INSTRUCTIONS
Follow-up for recheck in clinic tomorrow as scheduled.  Follow-up with gastroenterology as scheduled and discuss getting set up for a colonoscopy for further evaluation.  Stool obtain for C. difficile testing, results is pending.  If it comes back positive you will be contacted.    Return to the emergency department for fevers, vomiting and unable to keep fluids down, or bloody stool, or worse in any way.

## 2024-07-30 NOTE — ED TRIAGE NOTES
Has been having abdominal pain on and off for about 1 week, was seen last week for this abdominal pain and things have not been getting any better.  Does have nausea, vomiting and diarrhea, and states hasn't been urinating much.  called to get an appointment with primary but was told to come to the ED.      Triage Assessment (Adult)       Row Name 07/30/24 1119          Triage Assessment    Airway WDL WDL        Respiratory WDL    Respiratory WDL WDL        Skin Circulation/Temperature WDL    Skin Circulation/Temperature WDL WDL        Cardiac WDL    Cardiac WDL WDL        Cognitive/Neuro/Behavioral WDL    Cognitive/Neuro/Behavioral WDL WDL

## 2024-07-31 ENCOUNTER — TELEPHONE (OUTPATIENT)
Dept: NURSING | Facility: CLINIC | Age: 34
End: 2024-07-31
Payer: COMMERCIAL

## 2024-07-31 NOTE — TELEPHONE ENCOUNTER
[facility]    Reason for call: Lab Result Notification     Lab Result (including Rx patient on, if applicable).  If culture, copy of lab report at bottom.    Lab Result: C. Diff  - no active disease    Component      Latest Ref Rng 7/30/2024  1:49 PM   C. difficile GDH Antigen      Negative  Positive !      Component      Latest Ref Rng 7/30/2024  1:49 PM   C. Difficile Toxin      Negative  Negative      No ED Antibiotic prescribed    Creatinine Level (mg/dl)   Creatinine   Date Value Ref Range Status   07/30/2024 0.98 0.67 - 1.17 mg/dL Final   06/30/2011 0.93 0.50 - 1.00 mg/dL Final    Creatinine clearance (ml/min), if applicable    Serum creatinine: 0.98 mg/dL 07/30/24 1155  Estimated creatinine clearance: 160.7 mL/min   Patient presented to [facility] ED on 7/30/24 for the 3rd ED in the last week for abdominal pain and diarrhea    RN Recommendations/Instructions per [facility] ED lab result protocol:   [facility] ED lab result protocol utilized: C. Difficile protocol    Unable to reach patient/caregiver.     Left voicemail message requesting a call back to [phone] between 9 a.m. and 5:30 p.m. for patient's ED/UC lab results.      Letter pended to be sent via [employer].    Addendum: patient returned call at 9:48am on 7/31/24    Patient's current Symptoms:   Patient states he is feeling much better today. No abdominal pain or cramping. Tolerated boiled carrots last night. Still having some diarrhea, but fewer episodes.    Patient/care giver notified to contact your PCP clinic or return to the Emergency department if your:  Symptoms return.  Symptoms worsen or other concerning symptoms.       Sneha Bloom RN

## 2024-07-31 NOTE — LETTER
July 31, 2024        Gucci Khan  [id number] [facility] [id number]          Dear Gucci Khan:    You were seen in the [facility] Emergency Department at [facility] on 7/30/2024.  We are unable to reach you by phone, so we are sending you this letter.     It is important that you call [facility] Emergency Department lab result nurse at [phone], as we have information to relay to you AND/OR we MAY have to make some changes in your treatment.    Best time to call back is between 9AM and 5:30PM, 7 days a week.      Sincerely,     [facility] Emergency Department Lab Result RN  [phone]

## 2024-08-02 NOTE — TELEPHONE ENCOUNTER
Clinic Navigator sent a [facility] message to inform the Pt that Pt is on the wait list for a sooner appointment. Clinic Navigator will reach out to the Pt via phone call or [employer] [employer] when a sooner appointment becomes available.

## 2025-01-25 ENCOUNTER — HEALTH MAINTENANCE LETTER (OUTPATIENT)
Age: 35
End: 2025-01-25